# Patient Record
Sex: MALE | Race: WHITE | NOT HISPANIC OR LATINO | Employment: PART TIME | URBAN - METROPOLITAN AREA
[De-identification: names, ages, dates, MRNs, and addresses within clinical notes are randomized per-mention and may not be internally consistent; named-entity substitution may affect disease eponyms.]

---

## 2020-05-04 ENCOUNTER — TRANSFERRED RECORDS (OUTPATIENT)
Dept: HEALTH INFORMATION MANAGEMENT | Facility: CLINIC | Age: 55
End: 2020-05-04

## 2022-09-14 ENCOUNTER — TRANSFERRED RECORDS (OUTPATIENT)
Dept: HEALTH INFORMATION MANAGEMENT | Facility: CLINIC | Age: 57
End: 2022-09-14

## 2022-11-09 ENCOUNTER — MEDICAL CORRESPONDENCE (OUTPATIENT)
Dept: HEALTH INFORMATION MANAGEMENT | Facility: CLINIC | Age: 57
End: 2022-11-09

## 2022-11-16 ENCOUNTER — TELEPHONE (OUTPATIENT)
Dept: FAMILY MEDICINE | Facility: CLINIC | Age: 57
End: 2022-11-16

## 2022-11-16 NOTE — TELEPHONE ENCOUNTER
Reason for Call:  Appointment Request    Patient requesting this type of appt: Chronic Diease Management/Medication/Follow-Up    Requested provider: No primary care provider on file.    Reason patient unable to be scheduled: Not within requested timeframe    When does patient want to be seen/preferred time: 1-2 days    Comments: Need new doctor for medicine refill and establish care with. Need to be seen soon to get medicine refilled moved from out of town.    Could we send this information to you in [email or web address] or would you prefer to receive a phone call?:   Patient would prefer a phone call   Okay to leave a detailed message?: Yes at Cell number on file:    Telephone Information:   Mobile [phone]       Call taken on 11/16/2022 at 1:24 PM by Maye Kelly

## 2022-11-18 NOTE — TELEPHONE ENCOUNTER
Attempted to call patient. LVM explaining that we have very few providers taking on new patients and that they are booked out at least a few weeks and some into the new year.     Gave him our clinic number and cent sched number so he can try calling again. We do not have any way to help him within 1-2 days. My superior told me to have him reach out to his previous PCP/prescriber to see if they can help bridge his medications until he can get in. I did say this in the message.

## 2022-12-08 ENCOUNTER — OFFICE VISIT (OUTPATIENT)
Dept: FAMILY MEDICINE | Facility: CLINIC | Age: 57
End: 2022-12-08
Payer: COMMERCIAL

## 2022-12-08 VITALS
HEIGHT: 69 IN | TEMPERATURE: 98.1 F | HEART RATE: 75 BPM | OXYGEN SATURATION: 98 % | SYSTOLIC BLOOD PRESSURE: 138 MMHG | DIASTOLIC BLOOD PRESSURE: 76 MMHG | WEIGHT: 186.4 LBS | BODY MASS INDEX: 27.61 KG/M2

## 2022-12-08 DIAGNOSIS — I10 ESSENTIAL HYPERTENSION: Primary | ICD-10-CM

## 2022-12-08 DIAGNOSIS — F33.0 MILD EPISODE OF RECURRENT MAJOR DEPRESSIVE DISORDER (H): ICD-10-CM

## 2022-12-08 DIAGNOSIS — G47.00 INSOMNIA, UNSPECIFIED TYPE: ICD-10-CM

## 2022-12-08 DIAGNOSIS — L40.50 PSORIATIC ARTHRITIS (H): ICD-10-CM

## 2022-12-08 DIAGNOSIS — K21.9 GASTROESOPHAGEAL REFLUX DISEASE WITHOUT ESOPHAGITIS: ICD-10-CM

## 2022-12-08 PROBLEM — M17.0 PRIMARY OSTEOARTHRITIS OF BOTH KNEES: Status: ACTIVE | Noted: 2021-08-17

## 2022-12-08 PROBLEM — D50.9 MICROCYTIC ANEMIA: Status: ACTIVE | Noted: 2021-12-13

## 2022-12-08 PROBLEM — M25.561 CHRONIC PAIN OF BOTH KNEES: Status: ACTIVE | Noted: 2021-08-06

## 2022-12-08 PROBLEM — M25.562 CHRONIC PAIN OF BOTH KNEES: Status: ACTIVE | Noted: 2021-08-06

## 2022-12-08 PROBLEM — M17.11 PRIMARY OSTEOARTHRITIS OF RIGHT KNEE: Status: ACTIVE | Noted: 2021-09-09

## 2022-12-08 PROBLEM — G89.29 CHRONIC PAIN OF BOTH KNEES: Status: ACTIVE | Noted: 2021-08-06

## 2022-12-08 PROCEDURE — 99203 OFFICE O/P NEW LOW 30 MIN: CPT | Performed by: NURSE PRACTITIONER

## 2022-12-08 RX ORDER — DOCUSATE SODIUM 100 MG/1
100 CAPSULE, LIQUID FILLED ORAL
COMMUNITY
Start: 2021-10-21 | End: 2022-12-08

## 2022-12-08 RX ORDER — UPADACITINIB 15 MG/1
TABLET, EXTENDED RELEASE ORAL
COMMUNITY
End: 2023-02-24

## 2022-12-08 RX ORDER — MELOXICAM 15 MG/1
15 TABLET ORAL DAILY
COMMUNITY
End: 2022-12-08

## 2022-12-08 RX ORDER — AMLODIPINE BESYLATE 5 MG/1
1 TABLET ORAL DAILY
COMMUNITY
Start: 2022-08-25 | End: 2022-12-08

## 2022-12-08 RX ORDER — PIMECROLIMUS 10 MG/G
CREAM TOPICAL
COMMUNITY
Start: 2021-09-10 | End: 2022-12-08

## 2022-12-08 RX ORDER — HYDROCORTISONE 2.5 %
CREAM (GRAM) TOPICAL
COMMUNITY
Start: 2021-09-10 | End: 2022-12-08

## 2022-12-08 RX ORDER — PANTOPRAZOLE SODIUM 40 MG/1
1 TABLET, DELAYED RELEASE ORAL DAILY
COMMUNITY
Start: 2022-08-25 | End: 2022-12-08

## 2022-12-08 RX ORDER — OMEGA-3 FATTY ACIDS/FISH OIL 300-1000MG
2 CAPSULE ORAL DAILY
COMMUNITY

## 2022-12-08 RX ORDER — METHOTREXATE SODIUM 25 MG/ML
INJECTION, SOLUTION INTRA-ARTERIAL; INTRAMUSCULAR; INTRAVENOUS
COMMUNITY
Start: 2022-08-01 | End: 2023-02-24

## 2022-12-08 RX ORDER — TRAZODONE HYDROCHLORIDE 100 MG/1
100 TABLET ORAL
COMMUNITY
Start: 2022-08-25 | End: 2022-12-08

## 2022-12-08 RX ORDER — LISINOPRIL AND HYDROCHLOROTHIAZIDE 20; 25 MG/1; MG/1
1 TABLET ORAL DAILY
COMMUNITY
Start: 2022-08-25 | End: 2022-12-08

## 2022-12-08 RX ORDER — FOLIC ACID 1 MG/1
1 TABLET ORAL DAILY
COMMUNITY
Start: 2022-08-25 | End: 2022-12-08

## 2022-12-08 RX ORDER — SERTRALINE HYDROCHLORIDE 100 MG/1
1 TABLET, FILM COATED ORAL DAILY
COMMUNITY
Start: 2022-08-25 | End: 2022-12-08

## 2022-12-08 RX ORDER — MULTIPLE VITAMINS W/ MINERALS TAB 9MG-400MCG
1 TAB ORAL DAILY
COMMUNITY

## 2022-12-08 RX ORDER — CALCIPOTRIENE AND BETAMETHASONE DIPROPIONATE 50; .5 UG/G; MG/G
SUSPENSION TOPICAL
COMMUNITY
Start: 2021-11-19 | End: 2022-12-08

## 2022-12-08 ASSESSMENT — PAIN SCALES - GENERAL: PAINLEVEL: SEVERE PAIN (6)

## 2022-12-08 NOTE — PROGRESS NOTES
Assessment and Plan:     Essential hypertension  This is controlled.  He continues amlodipine and lisinopril-hydrochlorothiazide. He had normal renal function on 9/15/22.   - amLODIPine (NORVASC) 5 MG tablet  Dispense: 90 tablet; Refill: 3  - lisinopril-hydrochlorothiazide (ZESTORETIC) 20-25 MG tablet  Dispense: 90 tablet; Refill: 3    Psoriatic arthritis (H)  Will refer to rheumatology.  I encouraged him to have his prior rheumatologist refill his medications.  Otherwise, will we will consider an E-consult.   - Calcipotriene-Betameth Diprop 0.005-0.064 % SUSP  Dispense: 120 g; Refill: 3  - pimecrolimus (ELIDEL) 1 % external cream  Dispense: 100 g; Refill: 3  - hydrocortisone 2.5 % cream  Dispense: 30 g; Refill: 3  - Adult Rheumatology  Referral    Gastroesophageal reflux disease without esophagitis  This is controlled with pantoprazole.  - pantoprazole (PROTONIX) 40 MG EC tablet  Dispense: 90 tablet; Refill: 3    Mild episode of recurrent major depressive disorder (H)  This is controlled with sertraline.  - sertraline (ZOLOFT) 100 MG tablet  Dispense: 90 tablet; Refill: 3    Insomnia, unspecified type  This is controlled with trazodone.  - traZODone (DESYREL) 100 MG tablet  Dispense: 90 tablet; Refill: 3        Subjective:     Giovanni is a 57 year old male presenting to the clinic to establish care.  Patient lived in [place] have moved here to be close to his children.  Patient has hypertension which is controlled with amlodipine 5 mg daily and lisinopril-hydrochlorothiazide 20-25 mg daily.  He is consuming a healthy diet.  He has a history of rheumatoid arthritis and psoriatic arthritis.  He was prescribed methotrexate and Rinvoq.  He has been without medication for 6 weeks. He uses betamethasone cream as needed for psoriatic rash.  Patient complains of pain, primarily within his hands.  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  Patient takes pantoprazole for GERD which is well controlled.  He is taking  sertraline 100 mg daily for depression and anxiety.  He was diagnosed 7 years ago.  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  He feels as though his mood is stable.  He feels supported.  He denies thoughts of suicide.  He takes trazodone nightly to assist with falling asleep.    Answers for HPI/ROS submitted by the patient on 12/8/2022  Do you check your blood pressure regularly outside of the clinic?: No  Are your blood pressures ever more than 140 on the top number (systolic) OR more than 90 on the bottom number (diastolic)? (For example, greater than 140/90): Yes  Are you following a low salt diet?: No    Reviewof Systems: A complete 14 point review of systems was obtained and is negative or as stated in the history of present illness.    Social History     Socioeconomic History     Marital status:      Spouse name: Not on file     Number of children: Not on file     Years of education: Not on file     Highest education level: Not on file   Occupational History     Not on file   Tobacco Use     Smoking status: Never     Smokeless tobacco: Never   Substance and Sexual Activity     Alcohol use: Yes     Alcohol/week: 3.0 standard drinks     Types: 1 Glasses of wine, 1 Cans of beer, 1 Shots of liquor per week     Drug use: Never     Sexual activity: Not on file   Other Topics Concern     Not on file   Social History Narrative     Not on file     Social Determinants of Health     Financial Resource Strain: Not on file   Food Insecurity: Not on file   Transportation Needs: Not on file   Physical Activity: Not on file   Stress: Not on file   Social Connections: Not on file   Intimate Partner Violence: Not on file   Housing Stability: Not on file       Active Ambulatory Problems     Diagnosis Date Noted     Chronic pain of both knees 08/06/2021     Dyslipidemia 01/09/2019     Episodic mood disorder (H) 05/14/2015     Essential hypertension 05/14/2015     Gastroesophageal reflux disease without  "esophagitis 12/12/2016     Insomnia 05/14/2015     Microcytic anemia 12/13/2021     Primary osteoarthritis of both knees 08/17/2021     Primary osteoarthritis of right knee 09/09/2021     Psoriasis 12/08/2022     Psoriatic arthritis (H) 12/08/2022     Resolved Ambulatory Problems     Diagnosis Date Noted     No Resolved Ambulatory Problems     No Additional Past Medical History       Family History   Problem Relation Age of Onset     Coronary Artery Disease Mother      Bladder Cancer Father        Objective:     /76 (BP Location: Right arm, Patient Position: Sitting, Cuff Size: Adult Regular)   Pulse 75   Temp 98.1  F (36.7  C) (Oral)   Ht 1.746 m (5' 8.74\")   Wt 84.6 kg (186 lb 6.4 oz)   SpO2 98%   BMI 27.74 kg/m      Patient is alert, in no obvious distress.   Skin: Warm, dry.    Lungs:  Clear to auscultation. Respirations even and unlabored.  No wheezing or rales noted.   Heart:  Regular rate and rhythm.  No murmurs, S3, S4, gallops, or rubs.    Abdomen: Soft, nontender.  No organomegaly. Bowel sounds normoactive. No guarding or masses noted.   Musculoskeletal:  No edema is present in bilateral lower extremities.              "

## 2022-12-09 RX ORDER — PANTOPRAZOLE SODIUM 40 MG/1
40 TABLET, DELAYED RELEASE ORAL DAILY
Qty: 90 TABLET | Refills: 3 | Status: SHIPPED | OUTPATIENT
Start: 2022-12-09 | End: 2023-08-17

## 2022-12-09 RX ORDER — HYDROCORTISONE 2.5 %
CREAM (GRAM) TOPICAL 2 TIMES DAILY
Qty: 30 G | Refills: 3 | Status: SHIPPED | OUTPATIENT
Start: 2022-12-09

## 2022-12-09 RX ORDER — CALCIPOTRIENE AND BETAMETHASONE DIPROPIONATE 50; .5 UG/G; MG/G
SUSPENSION TOPICAL
Qty: 120 G | Refills: 3 | Status: SHIPPED | OUTPATIENT
Start: 2022-12-09

## 2022-12-09 RX ORDER — TRAZODONE HYDROCHLORIDE 100 MG/1
100 TABLET ORAL AT BEDTIME
Qty: 90 TABLET | Refills: 3 | Status: SHIPPED | OUTPATIENT
Start: 2022-12-09 | End: 2023-08-17

## 2022-12-09 RX ORDER — LISINOPRIL AND HYDROCHLOROTHIAZIDE 20; 25 MG/1; MG/1
1 TABLET ORAL DAILY
Qty: 90 TABLET | Refills: 3 | Status: SHIPPED | OUTPATIENT
Start: 2022-12-09 | End: 2023-08-17

## 2022-12-09 RX ORDER — PIMECROLIMUS 10 MG/G
CREAM TOPICAL 2 TIMES DAILY
Qty: 100 G | Refills: 3 | Status: SHIPPED | OUTPATIENT
Start: 2022-12-09

## 2022-12-09 RX ORDER — SERTRALINE HYDROCHLORIDE 100 MG/1
100 TABLET, FILM COATED ORAL DAILY
Qty: 90 TABLET | Refills: 3 | Status: SHIPPED | OUTPATIENT
Start: 2022-12-09 | End: 2023-08-17

## 2022-12-09 RX ORDER — AMLODIPINE BESYLATE 5 MG/1
5 TABLET ORAL DAILY
Qty: 90 TABLET | Refills: 3 | Status: SHIPPED | OUTPATIENT
Start: 2022-12-09 | End: 2023-08-17

## 2022-12-12 NOTE — PROGRESS NOTES
Rheumatology Clinic Visit  [facility]  Rashaun Smith M.D.     Giovanni Mcgill MRN# [id number]   YOB: 1965 Age: 57 year old   Date of Visit: 12/14/2022  Primary care provider: No primary care provider on file.          Assessment and Plan:     # Psoriatic arthritis: Patient relates accelerating pain swelling, stiffness, and decreased range of motion in multiple finger joints, right greater than left hand.  Exam shows synovial thickening at right second MCP and chronic changes consistent with prior or current inflammation in multiple finger joints.  No psoriasis notable.    Data: Outside facility laboratory data from September 2022 showed hemoglobin slightly reduced at 12.4 with an MCV of 82.6; electrolytes and creatinine were normal; albumin total protein and transaminases normal; CRP less than 3.0; sedimentation rate of 10, normal.   In 2020, hepatitis B, hepatitis C, and rheumatoid factor, cyclic citrullinated peptide antibodies were negative.    Imaging: Reports of x-rays of both hands done on May 4, 2020 revealed mild bilateral osteoarthritis of the hands and thumb bases and a well-corticated triquetrium fracture in the left wrist.    Discussion: Longstanding psoriatic arthritis, appropriately treated with a succession of immunomodulatory agents including tumor necrosis factor inhibition, methotrexate, apremilast, IL 23 inhibitors, and most recently Norman kinase inhibitor.  Symptom complex and physical exam is compatible with mild/moderate inflammatory disease activity.  Patient reports that use of Rinvoq for 4 to 6 weeks in early fall 2022 was associated with improvement in pain and swelling.  I think that both for reduction of symptoms, and for protection of joints against long-term damage, immunomodulatory therapy is warranted.  I agree with prior rheumatology recommendations to make a trial of upadacitinib.  I do not think that patient has used the agent to fully assess efficacy.  I  "recommend resuming upadacitinib after establishing baseline LFT, kidney function, CBC, and lipid panel lab measurements.  I expect maximum benefit from upadacitinib in 6 to 8 weeks.  Alternative treatments to consider would include addition of methotrexate to upadacitinib, or to consider abatacept for alternative IL-17 antagonist (Taltz, patient has already tried and discarded secukinumab).    Plan:  1.  Check kidney function, liver function, blood counts, and lipid panel.  2.  Resume upadacitinib 15 mg once daily for psoriatic arthritis.  Expect maximum benefit in 4 to 6 weeks.  3.  Report to rheumatology on tolerability and benefit at 6-8 weeks.  4.  For residual occasional joint pain, use ibuprofen 600 to 800 mg up to twice daily as needed.    Rashaun Smith MD  Staff Rheumatologist, [facility]    On the day of the encounter, a total of 65 minutes was spent in chart review, and in counseling and coordination of care, regarding the patient's complex medical problem of chronic psoriatic arthritis.             History of Present Illness:   Giovanni Mcgill presents for evaluation of psoriatic arthritis.    Patient was last seen by Rheumatologist Dr. Cardoso in [place] in 8-2022. Except from Dr. Cardoso's note containing timeline of anti-rheumatic medication use for psoriatic arthritis:    \"...Due to persistent joint swelling and pain over his hands despite chronic Humira use, treatment was switched to Otezla in Feb 2020, after discussion with Dermatology, given prior good response (was discontinued in the past due to change in insurance coverage). However, patient did not have as good of a response to Otezla this time around, and it was switched to Cosentyx (May 2020). Due to persistent MSK symptoms, MTX was added in June 2020. However, due to persistent disease, Cosentyx was switched to Enbrel (Sept 2020). Patient did better on combination of subQ MTX + Enbrel. SSZ was added in Aug 2021 for residual MSK symptoms, but " "was discontinued soon after due to significant GI side effects. Due to residual MSK symptoms on Enbrel previously, discussed switching therapy to either Stelara, Tremfya, or Taltz -- patient opted for Stelara (Nov 2021). However, patient had persistent MSK symptoms on chronic MTX + Stelara. Stelara was thus switched to Tremfya (Apr 2022)...\"      In 8-12 Dr. Cardoso noted persistent swelling over right wrist and right 2nd MCP on exam. Patient has had 3 doses of Tremfya so far (including loading dose), and he mentions having persistent polyarthritis on this and full dose subQ MTX. Denies any missed doses of DMARD therapy. Impression was of active psoriatic arthritis, recently started on Tremfya.  Plan was:    \"- Will give Tremfya a bit more time to adequately assess efficacy, or lack thereof -- continue 100mg subQ every 8 weeks as maintenance dose  - Offered prednisone vs IM corticosteroid for persistent MSK symptoms, but patient opted to hold off for now  - Continue subQ MTX 25mg weekly  - Continue folic acid 1mg daily  - Check repeat CBC/D, CMP, ESR, and CRP in 8 weeks for MTX toxicity monitoring\"    Patient saw rheumatology 1 further time in September 2022.  At that visit, lack of response to Tremfya was noted.  Recommendation was to discontinue Tremfya and to start Rinvoq daily.    Initial history December 14, 2022    Patient reports onset of swelling, redness, and pain in multiple finger and knuckle joints at around age 31.  At approximately the same time, he experienced onset of a scaly \"patch\" on the left knee, and occasional scaly patches over the belly, gluteal cleft, scalp, and chest.  He did not have psoriasis as a teenager or child.  The scaly patches were manageable with use of as needed Elidel cream.  Joint pain progressed and interfered with activities of daily living.  He reports receiving significant relief with use of tapering prednisone, but when prednisone was removed, he was started on " methotrexate.  Methotrexate gave inadequate relief after 3 to 4 months.  He was then treated with etanercept with good response.  He believes he started etanercept shortly after it became FDA approved in the early 2000's, and used it continuously until approximately 2012.  At that time, etanercept was discontinued and Humira was substituted due to a change of insurance.  He had continued relief of inflammatory joint symptoms as well as reduced psoriasis with use of Humira.  In the range of 9751-1989, he substituted Otezla for Humira; again good relief of both joint and skin symptoms were noted, but insurance dictated that Humira be restarted as of approximately 2015.  He took Humira through February 2020, when Otezla was again tried.  At that point, patient endorses the antirheumatic medication history that Dr. Taylor listed in his notes of September and July 2022.    In the last 5 months, he reports that Tremfya was discontinued after approximately 3 to 4 months trial.  He noted no change in daily joint pain.  As of early September, Tremfya was discontinued, and Rinvoq was substituted.  He reports that within 3 to 4 weeks, he noticed swelling and pain in his right second knuckle decreasing.  He was only able to continue Rinvoq for approximately 1 month before he changed residence and moved from [place] to [place].  He ran out of Rinvoq (he reports that he was given samples by previous treating rheumatologist) in mid October.  He has not used Rinvoq or methotrexate since mid October 2022.    Today he has pain in base of both thumbs, notes shape change in fingers of both hands.  Pain is gradually worsening, and difficulty with finger motion as increased in the last 6 weeks.  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  Holding and strumming the guitar has become more painful and difficult.  Lifting, gripping, opening jars are difficult because of decreased and painful painful pinch, especially right hand. He is R  handed, symptoms worse consistently in that hand.  Morning stiffness in the hands is approximately 30 to 60 minutes.  He denies eye pain, GI/ symptoms, low back pain, fevers chills or sweats, or worsening of psoriasis in the last several months.  He has not used oral prednisone for over a year.  He occasionally uses ibuprofen Tylenol or Aleve once or twice monthly.         Review of Systems:   Except for HPI, negative  Constitutional: negative  Skin: negative  Eyes: negative  Ears/Nose/Throat: negative  Respiratory: No shortness of breath, dyspnea on exertion, cough, or hemoptysis  Cardiovascular: negative  Gastrointestinal: negative  Genitourinary: negative  Musculoskeletal: negative  Neurologic: negative  Psychiatric: negative  Hematologic/Lymphatic/Immunologic: negative  Endocrine: negative         Active Problem List:     Patient Active Problem List    Diagnosis Date Noted     Psoriasis 12/08/2022     Priority: Medium     Formatting of this note might be different from the original.  On Stelara sees dermatology    Last Assessment & Plan:   Formatting of this note might be different from the original.  Continue the same       Psoriatic arthritis (H) 12/08/2022     Priority: Medium     Formatting of this note might be different from the original.  Sees rheumatology and derm  Recently started [facility].      Last Assessment & Plan:   Formatting of this note might be different from the original.  Continue the same       Microcytic anemia 12/13/2021     Priority: Medium     Formatting of this note might be different from the original.  Iron values are better and WNL.  Iron sulfate caused GI issues. Suspect has a component of AOCD. Will monitor.   He will start a multivitamin with iron.       Primary osteoarthritis of right knee 09/09/2021     Priority: Medium     Primary osteoarthritis of both knees 08/17/2021     Priority: Medium     Formatting of this note might be different from the original.  Per rheumatology.        Chronic pain of both knees 08/06/2021     Priority: Medium     Formatting of this note might be different from the original.  Per rheumatology.       Dyslipidemia 01/09/2019     Priority: Medium     Formatting of this note might be different from the original.  Not on meds       Gastroesophageal reflux disease without esophagitis 12/12/2016     Priority: Medium     Formatting of this note might be different from the original.  Well controled no dysphagia. Denies dark stools, BPR, and hemopytsis.  On protonix    Last Assessment & Plan:   Formatting of this note might be different from the original.  Stable continue the same.       Episodic mood disorder (H) 05/14/2015     Priority: Medium     Formatting of this note might be different from the original.  stable on zoloft.  Denies HSI, AVH, anhedonia, despair    Last Assessment & Plan:   Formatting of this note might be different from the original.  Continue the same       Essential hypertension 05/14/2015     Priority: Medium     Formatting of this note is different from the original.  BP Readings from Last 3 Encounters:   12/13/21 (!) 180/110   11/09/21 (!) 181/105   08/03/21 (!) 164/92     Taking meds.  Not well controlled. Denies chest pain and SOB, swelling, dizziness or orthostatics.  Increase HCTZ and add amlodipine.         Last Assessment & Plan:   Formatting of this note might be different from the original.  Will increase lisinopril to 20 mg. Leave HCTZ at 12.5 mg       Insomnia 05/14/2015     Priority: Medium     Formatting of this note might be different from the original.  Stable on trazodone  Continue same.       Last Assessment & Plan:   Formatting of this note might be different from the original.  Stable continue the same.              Past Medical History:   No past medical history on file.  Past Surgical History:   Procedure Laterality Date     REPLACEMENT TOTAL KNEE Bilateral    # Scoliosis since adolescence:  # Hiatal hernia: on protonix  "continuously for 20 year  #S/p TKR in 9 and 10 of 2021: reports being bow-legged. Much improved now.    # Psoriatic arthritis: Summary from [facility] 8-22\"...Due to persistent joint swelling and pain over his hands despite chronic Humira use, treatment was switched to Otezla in Feb 2020, after discussion with Dermatology, given prior good response (was discontinued in the past due to change in insurance coverage). However, patient did not have as good of a response to Otezla this time around, and it was switched to Cosentyx (May 2020). Due to persistent MSK symptoms, MTX was added in June 2020. However, due to persistent disease, Cosentyx was switched to Enbrel (Sept 2020). Patient did better on combination of subQ MTX + Enbrel. SSZ was added in Aug 2021 for residual MSK symptoms, but was discontinued soon after due to significant GI side effects. Due to residual MSK symptoms on Enbrel previously, discussed switching therapy to either Stelara, Tremfya, or Taltz -- patient opted for Stelara (Nov 2021). However, patient had persistent MSK symptoms on chronic MTX + Stelara. Stelara was thus switched to Tremfya (Apr 2022)...\"         Social History:     Social History     Socioeconomic History     Marital status:      Spouse name: Not on file     Number of children: Not on file     Years of education: Not on file     Highest education level: Not on file   Occupational History     Not on file   Tobacco Use     Smoking status: Never     Smokeless tobacco: Never   Substance and Sexual Activity     Alcohol use: Yes     Alcohol/week: 3.0 standard drinks     Types: 1 Glasses of wine, 1 Cans of beer, 1 Shots of liquor per week     Drug use: Never     Sexual activity: Not on file   Other Topics Concern     Not on file   Social History Narrative     Not on file     Social Determinants of Health     Financial Resource Strain: Not on file   Food Insecurity: Not on file   Transportation Needs: Not on file " "  Physical Activity: Not on file   Stress: Not on file   Social Connections: Not on file   Intimate Partner Violence: Not on file   Housing Stability: Not on file     Has worked as a //. 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  Has 2 biological children, healthy  Never smoker  Drinks 1 EtOH once weekly.       Family History:     Family History   Problem Relation Age of Onset     Coronary Artery Disease Mother      Bladder Cancer Father      MGF had \"skin disease\"  Mother has OA with THR an TKR         Allergies:   No Known Allergies         Medications:     Current Outpatient Medications   Medication Sig Dispense Refill     amLODIPine (NORVASC) 5 MG tablet Take 1 tablet (5 mg) by mouth daily 90 tablet 3     aspirin (ASA) 81 MG EC tablet        Calcipotriene-Betameth Diprop 0.005-0.064 % SUSP Apply twice daily to your affected skin for up to 2 weeks. Strength: 0.005-0.064 % 120 g 3     hydrocortisone 2.5 % cream Apply topically 2 times daily 30 g 3     lisinopril-hydrochlorothiazide (ZESTORETIC) 20-25 MG tablet Take 1 tablet by mouth daily 90 tablet 3     multivitamin w/minerals (THERA-VIT-M) tablet Take 1 tablet by mouth daily       omega 3 1000 MG CAPS Take 2 g by mouth daily       pantoprazole (PROTONIX) 40 MG EC tablet Take 1 tablet (40 mg) by mouth daily 90 tablet 3     pimecrolimus (ELIDEL) 1 % external cream Apply topically 2 times daily 100 g 3     sertraline (ZOLOFT) 100 MG tablet Take 1 tablet (100 mg) by mouth daily 90 tablet 3     traZODone (DESYREL) 100 MG tablet Take 1 tablet (100 mg) by mouth At Bedtime 90 tablet 3     Methotrexate 25 MG/ML SOSY Inject 25 mg Subcutaneous (Patient not taking: Reported on 12/8/2022)       methotrexate 250 MG/10ML SOLN injection INJECT 1ML SUBCUTANEOUSLY (UNDER THE SKIN) ONCE A WEEK (MULTI-DOSE VIAL) (Patient not taking: Reported on 12/8/2022)       Upadacitinib ER (RINVOQ) 15 MG TB24  (Patient not taking: Reported on 12/14/2022)              " "Physical Exam:   Blood pressure (!) 163/75, pulse 73, height 1.727 m (5' 8\"), weight 84.6 kg (186 lb 6.4 oz).  Wt Readings from Last 6 Encounters:   12/14/22 84.6 kg (186 lb 6.4 oz)   12/08/22 84.6 kg (186 lb 6.4 oz)     Constitutional: well-developed, appearing stated age; cooperative  Eyes: nl EOM, PERRLA, vision, conjunctiva, sclera  ENT: nl external ears, nose, hearing, lips, teeth, gums, throat  No mucous membrane lesions, normal saliva pool  Neck: no mass or thyroid enlargement  Resp: breathing unlabored  Lymph: no cervical, supraclavicular, inguinal or epitrochlear nodes  MS: There is enlargement and synovial thickening at the right second MCP; angulation and bony enlargement notable at multiple DIPs in both hands, prominently right second DIP with early \"swan-neck\" change.   strength is slightly reduced, right greater than left.  There is incomplete flexion of second fourth and fifth DIPs with fist formation in the right greater than left hands.  Cool swelling without tenderness noted at left wrist.  Wrist range of motion intact, as is elbow shoulder hip ankle and midfoot.  No tenderness at MTPs and no visible dactylitis or enthesopathy on lower extremity exam.  Low back shows no tenderness at SI joint; lumbar spine range of motion is intact.  There is rightward convex scoliosis in the thoracic spine.  Skin: no psoriasis visilbe at knees, low back  Neuro: nl cranial nerves, strength  Psych: nl judgement, orientation, memory, affect.         Data:     @No flowsheet data found.     ,  ,  ,  ,  ,  ,  ,  ,  ,  ,  ,  ,  ,  ,  ,  ,  ,  ,  ,  ,  ,  ,  ,  ,  ,  ,  ,  ,  ,  ,  ,  ,  ,  ,  ,  ,  ,  ,  ,  ,  ,  ,  ,  ,       "

## 2022-12-13 NOTE — TELEPHONE ENCOUNTER
NOTES Status Details   OFFICE NOTE from referring provider Internal 12.08.2022 Breanna Odell APRN CNP   OFFICE NOTE from other specialist Care Everywhere 09.14.2022 Yrn Cardoso MD     DISCHARGE SUMMARY from hospital     DISCHARGE REPORT from the ER     MEDICATION LIST Care Everywhere  /Internal    LABS (Any and all labs)      Care Everywhere    Biopsy/pathology (Anything related to diagnoses I.e. fluid aspirations, lip biopsy, muscle biopsy)               Imaging (All imaging related to diagnoses)     Echo     HRCT     CXR     EMG                    Scleroderma/Dermatomyositis diagnoses     Previous Cardiology notes      Previous Pulmonary notes     Previous Dermatology notes     Previous GI notes     Lupus diagnoses     Previous Nephrology notes     Previous Dermatology notes     Previous Cardiology notes

## 2022-12-14 ENCOUNTER — LAB (OUTPATIENT)
Dept: LAB | Facility: CLINIC | Age: 57
End: 2022-12-14
Payer: COMMERCIAL

## 2022-12-14 ENCOUNTER — TELEPHONE (OUTPATIENT)
Dept: RHEUMATOLOGY | Facility: CLINIC | Age: 57
End: 2022-12-14

## 2022-12-14 ENCOUNTER — OFFICE VISIT (OUTPATIENT)
Dept: RHEUMATOLOGY | Facility: CLINIC | Age: 57
End: 2022-12-14
Attending: INTERNAL MEDICINE
Payer: COMMERCIAL

## 2022-12-14 ENCOUNTER — PRE VISIT (OUTPATIENT)
Dept: RHEUMATOLOGY | Facility: CLINIC | Age: 57
End: 2022-12-14

## 2022-12-14 VITALS
HEART RATE: 73 BPM | DIASTOLIC BLOOD PRESSURE: 75 MMHG | HEIGHT: 68 IN | BODY MASS INDEX: 28.25 KG/M2 | SYSTOLIC BLOOD PRESSURE: 163 MMHG | WEIGHT: 186.4 LBS

## 2022-12-14 DIAGNOSIS — M19.90 INFLAMMATORY ARTHRITIS: Primary | ICD-10-CM

## 2022-12-14 DIAGNOSIS — L40.50 PSORIATIC ARTHRITIS (H): ICD-10-CM

## 2022-12-14 DIAGNOSIS — M19.90 INFLAMMATORY ARTHRITIS: ICD-10-CM

## 2022-12-14 LAB
ALBUMIN SERPL BCG-MCNC: 4.5 G/DL (ref 3.5–5.2)
ALT SERPL W P-5'-P-CCNC: 24 U/L (ref 10–50)
AST SERPL W P-5'-P-CCNC: 21 U/L (ref 10–50)
CHOLEST SERPL-MCNC: 196 MG/DL
CREAT SERPL-MCNC: 0.99 MG/DL (ref 0.67–1.17)
ERYTHROCYTE [DISTWIDTH] IN BLOOD BY AUTOMATED COUNT: 15.2 % (ref 10–15)
GFR SERPL CREATININE-BSD FRML MDRD: 89 ML/MIN/1.73M2
HCT VFR BLD AUTO: 39.7 % (ref 40–53)
HDLC SERPL-MCNC: 40 MG/DL
HGB BLD-MCNC: 12.9 G/DL (ref 13.3–17.7)
LDLC SERPL CALC-MCNC: 133 MG/DL
MCH RBC QN AUTO: 26.6 PG (ref 26.5–33)
MCHC RBC AUTO-ENTMCNC: 32.5 G/DL (ref 31.5–36.5)
MCV RBC AUTO: 82 FL (ref 78–100)
NONHDLC SERPL-MCNC: 156 MG/DL
PLATELET # BLD AUTO: 230 10E3/UL (ref 150–450)
RBC # BLD AUTO: 4.85 10E6/UL (ref 4.4–5.9)
TRIGL SERPL-MCNC: 116 MG/DL
WBC # BLD AUTO: 5.6 10E3/UL (ref 4–11)

## 2022-12-14 PROCEDURE — 85027 COMPLETE CBC AUTOMATED: CPT | Performed by: PATHOLOGY

## 2022-12-14 PROCEDURE — 84460 ALANINE AMINO (ALT) (SGPT): CPT | Performed by: PATHOLOGY

## 2022-12-14 PROCEDURE — 36415 COLL VENOUS BLD VENIPUNCTURE: CPT | Performed by: PATHOLOGY

## 2022-12-14 PROCEDURE — 82565 ASSAY OF CREATININE: CPT | Performed by: PATHOLOGY

## 2022-12-14 PROCEDURE — G0463 HOSPITAL OUTPT CLINIC VISIT: HCPCS | Performed by: INTERNAL MEDICINE

## 2022-12-14 PROCEDURE — 84450 TRANSFERASE (AST) (SGOT): CPT | Performed by: PATHOLOGY

## 2022-12-14 PROCEDURE — 99205 OFFICE O/P NEW HI 60 MIN: CPT | Performed by: INTERNAL MEDICINE

## 2022-12-14 PROCEDURE — 80061 LIPID PANEL: CPT | Performed by: PATHOLOGY

## 2022-12-14 PROCEDURE — 82040 ASSAY OF SERUM ALBUMIN: CPT | Performed by: PATHOLOGY

## 2022-12-14 PROCEDURE — G0463 HOSPITAL OUTPT CLINIC VISIT: HCPCS

## 2022-12-14 ASSESSMENT — PAIN SCALES - GENERAL: PAINLEVEL: SEVERE PAIN (7)

## 2022-12-14 NOTE — TELEPHONE ENCOUNTER
PA Initiation    Medication: Rinvoq PA  Insurance Company: [employer] - Phone [phone] Fax [phone]  Pharmacy Filling the Rx:    Filling Pharmacy Phone:    Filling Pharmacy Fax:    Start Date: 12/14/2022    Key: [id number]       no known allergies

## 2022-12-14 NOTE — PATIENT INSTRUCTIONS
Diagnosis:  1.  Psoriatic arthritis: Inflammation at several finger joints and knuckles is present. I recommend resuming immunomodulatory medication.    Plan:  1.  Check kidney function, liver function, blood counts, and lipid panel.  2.  Resume upadacitinib 15 mg once daily for psoriatic arthritis.  Expect maximum benefit in 4 to 6 weeks.  3.  Report to rheumatology on tolerability and benefit at 6-8 weeks.  4.  For residual occasional joint pain, use ibuprofen 600 to 800 mg up to twice daily as needed.

## 2022-12-14 NOTE — LETTER
Date:December 20, 2022      Patient was self referred, no letter generated. Do not send.        [facility] Health Information

## 2022-12-14 NOTE — NURSING NOTE
"Chief Complaint   Patient presents with     Consult     Establish care with RA     BP (!) 163/75   Pulse 73   Ht 1.727 m (5' 8\")   Wt 84.6 kg (186 lb 6.4 oz)   BMI 28.34 kg/m    Sheri Beach CMA on 12/14/2022 at 8:07 AM    "

## 2022-12-14 NOTE — LETTER
12/14/2022       RE: Giovanni Mcgill  [address]  [place] [address]     Dear Colleague,    Thank you for referring your patient, Giovanni Mcgill, to the [facility] [place] at [facility]. Please see a copy of my visit note below.    Rheumatology Clinic Visit  [facility]  Rashaun Smith M.D.     Giovanni Mcgill MRN# [id number]   YOB: 1965 Age: 57 year old   Date of Visit: 12/14/2022  Primary care provider: No primary care provider on file.          Assessment and Plan:     # Psoriatic arthritis: Patient relates accelerating pain swelling, stiffness, and decreased range of motion in multiple finger joints, right greater than left hand.  Exam shows synovial thickening at right second MCP and chronic changes consistent with prior or current inflammation in multiple finger joints.  No psoriasis notable.    Data: Outside facility laboratory data from September 2022 showed hemoglobin slightly reduced at 12.4 with an MCV of 82.6; electrolytes and creatinine were normal; albumin total protein and transaminases normal; CRP less than 3.0; sedimentation rate of 10, normal.   In 2020, hepatitis B, hepatitis C, and rheumatoid factor, cyclic citrullinated peptide antibodies were negative.    Imaging: Reports of x-rays of both hands done on May 4, 2020 revealed mild bilateral osteoarthritis of the hands and thumb bases and a well-corticated triquetrium fracture in the left wrist.    Discussion: Longstanding psoriatic arthritis, appropriately treated with a succession of immunomodulatory agents including tumor necrosis factor inhibition, methotrexate, apremilast, IL 23 inhibitors, and most recently Norman kinase inhibitor.  Symptom complex and physical exam is compatible with mild/moderate inflammatory disease activity.  Patient reports that use of Rinvoq for 4 to 6 weeks in early fall 2022 was associated with improvement in  "pain and swelling.  I think that both for reduction of symptoms, and for protection of joints against long-term damage, immunomodulatory therapy is warranted.  I agree with prior rheumatology recommendations to make a trial of upadacitinib.  I do not think that patient has used the agent to fully assess efficacy.  I recommend resuming upadacitinib after establishing baseline LFT, kidney function, CBC, and lipid panel lab measurements.  I expect maximum benefit from upadacitinib in 6 to 8 weeks.  Alternative treatments to consider would include addition of methotrexate to upadacitinib, or to consider abatacept for alternative IL-17 antagonist (Taltz, patient has already tried and discarded secukinumab).    Plan:  1.  Check kidney function, liver function, blood counts, and lipid panel.  2.  Resume upadacitinib 15 mg once daily for psoriatic arthritis.  Expect maximum benefit in 4 to 6 weeks.  3.  Report to rheumatology on tolerability and benefit at 6-8 weeks.  4.  For residual occasional joint pain, use ibuprofen 600 to 800 mg up to twice daily as needed.    Rashaun Smith MD  Staff Rheumatologist, [facility]    On the day of the encounter, a total of 65 minutes was spent in chart review, and in counseling and coordination of care, regarding the patient's complex medical problem of chronic psoriatic arthritis.             History of Present Illness:   Giovanni Mcgill presents for evaluation of psoriatic arthritis.    Patient was last seen by Rheumatologist Dr. Cardoso in [place] in 8-2022. Except from Dr. Cardoso's note containing timeline of anti-rheumatic medication use for psoriatic arthritis:    \"...Due to persistent joint swelling and pain over his hands despite chronic Humira use, treatment was switched to Otezla in Feb 2020, after discussion with Dermatology, given prior good response (was discontinued in the past due to change in insurance coverage). However, patient did not have as good of a response to Otezla " "this time around, and it was switched to Cosentyx (May 2020). Due to persistent MSK symptoms, MTX was added in June 2020. However, due to persistent disease, Cosentyx was switched to Enbrel (Sept 2020). Patient did better on combination of subQ MTX + Enbrel. SSZ was added in Aug 2021 for residual MSK symptoms, but was discontinued soon after due to significant GI side effects. Due to residual MSK symptoms on Enbrel previously, discussed switching therapy to either Stelara, Tremfya, or Taltz -- patient opted for Stelara (Nov 2021). However, patient had persistent MSK symptoms on chronic MTX + Stelara. Stelara was thus switched to Tremfya (Apr 2022)...\"      In 8-12 Dr. Cardoso noted persistent swelling over right wrist and right 2nd MCP on exam. Patient has had 3 doses of Tremfya so far (including loading dose), and he mentions having persistent polyarthritis on this and full dose subQ MTX. Denies any missed doses of DMARD therapy. Impression was of active psoriatic arthritis, recently started on Tremfya.  Plan was:    \"- Will give Tremfya a bit more time to adequately assess efficacy, or lack thereof -- continue 100mg subQ every 8 weeks as maintenance dose  - Offered prednisone vs IM corticosteroid for persistent MSK symptoms, but patient opted to hold off for now  - Continue subQ MTX 25mg weekly  - Continue folic acid 1mg daily  - Check repeat CBC/D, CMP, ESR, and CRP in 8 weeks for MTX toxicity monitoring\"    Patient saw rheumatology 1 further time in September 2022.  At that visit, lack of response to Tremfya was noted.  Recommendation was to discontinue Tremfya and to start Rinvoq daily.    Initial history December 14, 2022    Patient reports onset of swelling, redness, and pain in multiple finger and knuckle joints at around age 31.  At approximately the same time, he experienced onset of a scaly \"patch\" on the left knee, and occasional scaly patches over the belly, gluteal cleft, scalp, and chest.  He did not " have psoriasis as a teenager or child.  The scaly patches were manageable with use of as needed Elidel cream.  Joint pain progressed and interfered with activities of daily living.  He reports receiving significant relief with use of tapering prednisone, but when prednisone was removed, he was started on methotrexate.  Methotrexate gave inadequate relief after 3 to 4 months.  He was then treated with etanercept with good response.  He believes he started etanercept shortly after it became FDA approved in the early 2000's, and used it continuously until approximately 2012.  At that time, etanercept was discontinued and Humira was substituted due to a change of insurance.  He had continued relief of inflammatory joint symptoms as well as reduced psoriasis with use of Humira.  In the range of 9145-5754, he substituted Otezla for Humira; again good relief of both joint and skin symptoms were noted, but insurance dictated that Humira be restarted as of approximately 2015.  He took Humira through February 2020, when Otezla was again tried.  At that point, patient endorses the antirheumatic medication history that Dr. Taylor listed in his notes of September and July 2022.    In the last 5 months, he reports that Tremfya was discontinued after approximately 3 to 4 months trial.  He noted no change in daily joint pain.  As of early September, Tremfya was discontinued, and Rinvoq was substituted.  He reports that within 3 to 4 weeks, he noticed swelling and pain in his right second knuckle decreasing.  He was only able to continue Rinvoq for approximately 1 month before he changed residence and moved from [place] to [place].  He ran out of Rinvoq (he reports that he was given samples by previous treating rheumatologist) in mid October.  He has not used Rinvoq or methotrexate since mid October 2022.    Today he has pain in base of both thumbs, notes shape change in fingers of both hands.  Pain is gradually worsening, and  difficulty with finger motion as increased in the last 6 weeks.  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  Holding and strumming the guitar has become more painful and difficult.  Lifting, gripping, opening jars are difficult because of decreased and painful painful pinch, especially right hand. He is R handed, symptoms worse consistently in that hand.  Morning stiffness in the hands is approximately 30 to 60 minutes.  He denies eye pain, GI/ symptoms, low back pain, fevers chills or sweats, or worsening of psoriasis in the last several months.  He has not used oral prednisone for over a year.  He occasionally uses ibuprofen Tylenol or Aleve once or twice monthly.         Review of Systems:   Except for HPI, negative  Constitutional: negative  Skin: negative  Eyes: negative  Ears/Nose/Throat: negative  Respiratory: No shortness of breath, dyspnea on exertion, cough, or hemoptysis  Cardiovascular: negative  Gastrointestinal: negative  Genitourinary: negative  Musculoskeletal: negative  Neurologic: negative  Psychiatric: negative  Hematologic/Lymphatic/Immunologic: negative  Endocrine: negative         Active Problem List:     Patient Active Problem List    Diagnosis Date Noted     Psoriasis 12/08/2022     Priority: Medium     Formatting of this note might be different from the original.  On Stelara sees dermatology    Last Assessment & Plan:   Formatting of this note might be different from the original.  Continue the same       Psoriatic arthritis (H) 12/08/2022     Priority: Medium     Formatting of this note might be different from the original.  Sees rheumatology and derm  Recently started [facility].      Last Assessment & Plan:   Formatting of this note might be different from the original.  Continue the same       Microcytic anemia 12/13/2021     Priority: Medium     Formatting of this note might be different from the original.  Iron values are better and WNL.  Iron sulfate caused GI issues. Suspect has a  component of AOCD. Will monitor.   He will start a multivitamin with iron.       Primary osteoarthritis of right knee 09/09/2021     Priority: Medium     Primary osteoarthritis of both knees 08/17/2021     Priority: Medium     Formatting of this note might be different from the original.  Per rheumatology.       Chronic pain of both knees 08/06/2021     Priority: Medium     Formatting of this note might be different from the original.  Per rheumatology.       Dyslipidemia 01/09/2019     Priority: Medium     Formatting of this note might be different from the original.  Not on meds       Gastroesophageal reflux disease without esophagitis 12/12/2016     Priority: Medium     Formatting of this note might be different from the original.  Well controled no dysphagia. Denies dark stools, BPR, and hemopytsis.  On protonix    Last Assessment & Plan:   Formatting of this note might be different from the original.  Stable continue the same.       Episodic mood disorder (H) 05/14/2015     Priority: Medium     Formatting of this note might be different from the original.  stable on zoloft.  Denies HSI, AVH, anhedonia, despair    Last Assessment & Plan:   Formatting of this note might be different from the original.  Continue the same       Essential hypertension 05/14/2015     Priority: Medium     Formatting of this note is different from the original.  BP Readings from Last 3 Encounters:   12/13/21 (!) 180/110   11/09/21 (!) 181/105   08/03/21 (!) 164/92     Taking meds.  Not well controlled. Denies chest pain and SOB, swelling, dizziness or orthostatics.  Increase HCTZ and add amlodipine.         Last Assessment & Plan:   Formatting of this note might be different from the original.  Will increase lisinopril to 20 mg. Leave HCTZ at 12.5 mg       Insomnia 05/14/2015     Priority: Medium     Formatting of this note might be different from the original.  Stable on trazodone  Continue same.       Last Assessment & Plan:  "  Formatting of this note might be different from the original.  Stable continue the same.              Past Medical History:   No past medical history on file.  Past Surgical History:   Procedure Laterality Date     REPLACEMENT TOTAL KNEE Bilateral    # Scoliosis since adolescence:  # Hiatal hernia: on protonix continuously for 20 year  #S/p TKR in 9 and 10 of 2021: reports being bow-legged. Much improved now.    # Psoriatic arthritis: Summary from [facility] 8-22\"...Due to persistent joint swelling and pain over his hands despite chronic Humira use, treatment was switched to Otezla in Feb 2020, after discussion with Dermatology, given prior good response (was discontinued in the past due to change in insurance coverage). However, patient did not have as good of a response to Otezla this time around, and it was switched to Cosentyx (May 2020). Due to persistent MSK symptoms, MTX was added in June 2020. However, due to persistent disease, Cosentyx was switched to Enbrel (Sept 2020). Patient did better on combination of subQ MTX + Enbrel. SSZ was added in Aug 2021 for residual MSK symptoms, but was discontinued soon after due to significant GI side effects. Due to residual MSK symptoms on Enbrel previously, discussed switching therapy to either Stelara, Tremfya, or Taltz -- patient opted for Stelara (Nov 2021). However, patient had persistent MSK symptoms on chronic MTX + Stelara. Stelara was thus switched to Tremfya (Apr 2022)...\"         Social History:     Social History     Socioeconomic History     Marital status:      Spouse name: Not on file     Number of children: Not on file     Years of education: Not on file     Highest education level: Not on file   Occupational History     Not on file   Tobacco Use     Smoking status: Never     Smokeless tobacco: Never   Substance and Sexual Activity     Alcohol use: Yes     Alcohol/week: 3.0 standard drinks     Types: 1 Glasses of wine, 1 Cans of beer, " "1 Shots of liquor per week     Drug use: Never     Sexual activity: Not on file   Other Topics Concern     Not on file   Social History Narrative     Not on file     Social Determinants of Health     Financial Resource Strain: Not on file   Food Insecurity: Not on file   Transportation Needs: Not on file   Physical Activity: Not on file   Stress: Not on file   Social Connections: Not on file   Intimate Partner Violence: Not on file   Housing Stability: Not on file     Has worked as a //. 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  Has 2 biological children, healthy  Never smoker  Drinks 1 EtOH once weekly.       Family History:     Family History   Problem Relation Age of Onset     Coronary Artery Disease Mother      Bladder Cancer Father      MGF had \"skin disease\"  Mother has OA with THR an TKR         Allergies:   No Known Allergies         Medications:     Current Outpatient Medications   Medication Sig Dispense Refill     amLODIPine (NORVASC) 5 MG tablet Take 1 tablet (5 mg) by mouth daily 90 tablet 3     aspirin (ASA) 81 MG EC tablet        Calcipotriene-Betameth Diprop 0.005-0.064 % SUSP Apply twice daily to your affected skin for up to 2 weeks. Strength: 0.005-0.064 % 120 g 3     hydrocortisone 2.5 % cream Apply topically 2 times daily 30 g 3     lisinopril-hydrochlorothiazide (ZESTORETIC) 20-25 MG tablet Take 1 tablet by mouth daily 90 tablet 3     multivitamin w/minerals (THERA-VIT-M) tablet Take 1 tablet by mouth daily       omega 3 1000 MG CAPS Take 2 g by mouth daily       pantoprazole (PROTONIX) 40 MG EC tablet Take 1 tablet (40 mg) by mouth daily 90 tablet 3     pimecrolimus (ELIDEL) 1 % external cream Apply topically 2 times daily 100 g 3     sertraline (ZOLOFT) 100 MG tablet Take 1 tablet (100 mg) by mouth daily 90 tablet 3     traZODone (DESYREL) 100 MG tablet Take 1 tablet (100 mg) by mouth At Bedtime 90 tablet 3     Methotrexate 25 MG/ML SOSY Inject 25 mg Subcutaneous " "(Patient not taking: Reported on 12/8/2022)       methotrexate 250 MG/10ML SOLN injection INJECT 1ML SUBCUTANEOUSLY (UNDER THE SKIN) ONCE A WEEK (MULTI-DOSE VIAL) (Patient not taking: Reported on 12/8/2022)       Upadacitinib ER (RINVOQ) 15 MG TB24  (Patient not taking: Reported on 12/14/2022)              Physical Exam:   Blood pressure (!) 163/75, pulse 73, height 1.727 m (5' 8\"), weight 84.6 kg (186 lb 6.4 oz).  Wt Readings from Last 6 Encounters:   12/14/22 84.6 kg (186 lb 6.4 oz)   12/08/22 84.6 kg (186 lb 6.4 oz)     Constitutional: well-developed, appearing stated age; cooperative  Eyes: nl EOM, PERRLA, vision, conjunctiva, sclera  ENT: nl external ears, nose, hearing, lips, teeth, gums, throat  No mucous membrane lesions, normal saliva pool  Neck: no mass or thyroid enlargement  Resp: breathing unlabored  Lymph: no cervical, supraclavicular, inguinal or epitrochlear nodes  MS: There is enlargement and synovial thickening at the right second MCP; angulation and bony enlargement notable at multiple DIPs in both hands, prominently right second DIP with early \"swan-neck\" change.   strength is slightly reduced, right greater than left.  There is incomplete flexion of second fourth and fifth DIPs with fist formation in the right greater than left hands.  Cool swelling without tenderness noted at left wrist.  Wrist range of motion intact, as is elbow shoulder hip ankle and midfoot.  No tenderness at MTPs and no visible dactylitis or enthesopathy on lower extremity exam.  Low back shows no tenderness at SI joint; lumbar spine range of motion is intact.  There is rightward convex scoliosis in the thoracic spine.  Skin: no psoriasis visilbe at knees, low back  Neuro: nl cranial nerves, strength  Psych: nl judgement, orientation, memory, affect.         Data:     @No flowsheet data found.     ,  ,  ,  ,  ,  ,  ,  ,  ,  ,  ,  ,  ,  ,  ,  ,  ,  ,  ,  ,  ,  ,  ,  ,  ,  ,  ,  ,  ,  ,  ,  ,  ,  ,  ,  ,  ,  ,  ,  ,  ,  , "  ,  ,           Again, thank you for allowing me to participate in the care of your patient.      Sincerely,    Rashaun Smith MD

## 2022-12-19 ENCOUNTER — MYC MEDICAL ADVICE (OUTPATIENT)
Dept: RHEUMATOLOGY | Facility: CLINIC | Age: 57
End: 2022-12-19

## 2022-12-19 NOTE — TELEPHONE ENCOUNTER
Prior Authorization Approval    Authorization Effective Date: 11/14/2022  Authorization Expiration Date: 12/15/2023  Medication: Rinvoq PA  Approved Dose/Quantity: qd  Reference #: Key: [id number]   Insurance Company: [employer] - Phone [phone] Fax [phone]  Expected CoPay:       CoPay Card Available:      Foundation Assistance Needed:    Which Pharmacy is filling the prescription (Not needed for infusion/clinic administered): [facility] - [place] [address]  Pharmacy Notified: Yes  Patient Notified: Yes

## 2023-01-17 DIAGNOSIS — Z12.11 COLON CANCER SCREENING: Primary | ICD-10-CM

## 2023-01-17 DIAGNOSIS — Z12.5 SCREENING FOR PROSTATE CANCER: ICD-10-CM

## 2023-02-09 ENCOUNTER — MYC MEDICAL ADVICE (OUTPATIENT)
Dept: RHEUMATOLOGY | Facility: CLINIC | Age: 58
End: 2023-02-09
Payer: COMMERCIAL

## 2023-02-12 ENCOUNTER — HEALTH MAINTENANCE LETTER (OUTPATIENT)
Age: 58
End: 2023-02-12

## 2023-02-23 ASSESSMENT — ENCOUNTER SYMPTOMS
JOINT SWELLING: 1
CONSTIPATION: 0
NERVOUS/ANXIOUS: 0
PARESTHESIAS: 0
PALPITATIONS: 0
FEVER: 0
DIZZINESS: 0
FREQUENCY: 1
EYE PAIN: 0
HEMATOCHEZIA: 0
HEADACHES: 0
ARTHRALGIAS: 1
CHILLS: 0
WEAKNESS: 0
DIARRHEA: 0
HEMATURIA: 0
ABDOMINAL PAIN: 0
NAUSEA: 0
SORE THROAT: 0
MYALGIAS: 0
SHORTNESS OF BREATH: 0
HEARTBURN: 0
DYSURIA: 0
COUGH: 0

## 2023-02-24 ENCOUNTER — OFFICE VISIT (OUTPATIENT)
Dept: FAMILY MEDICINE | Facility: CLINIC | Age: 58
End: 2023-02-24
Payer: COMMERCIAL

## 2023-02-24 VITALS
BODY MASS INDEX: 28.84 KG/M2 | SYSTOLIC BLOOD PRESSURE: 150 MMHG | DIASTOLIC BLOOD PRESSURE: 90 MMHG | HEIGHT: 68 IN | HEART RATE: 56 BPM | OXYGEN SATURATION: 98 % | TEMPERATURE: 97.5 F | WEIGHT: 190.31 LBS

## 2023-02-24 DIAGNOSIS — Z87.898 HISTORY OF POLYURIA: ICD-10-CM

## 2023-02-24 DIAGNOSIS — L40.9 PSORIASIS: ICD-10-CM

## 2023-02-24 DIAGNOSIS — Z12.11 COLON CANCER SCREENING: ICD-10-CM

## 2023-02-24 DIAGNOSIS — R59.9 ENLARGED LYMPH NODES: ICD-10-CM

## 2023-02-24 DIAGNOSIS — Z00.00 ANNUAL PHYSICAL EXAM: ICD-10-CM

## 2023-02-24 DIAGNOSIS — F39 EPISODIC MOOD DISORDER (H): ICD-10-CM

## 2023-02-24 DIAGNOSIS — N52.9 ERECTILE DYSFUNCTION, UNSPECIFIED ERECTILE DYSFUNCTION TYPE: ICD-10-CM

## 2023-02-24 DIAGNOSIS — M19.90 INFLAMMATORY ARTHRITIS: ICD-10-CM

## 2023-02-24 DIAGNOSIS — Z12.5 SCREENING FOR PROSTATE CANCER: ICD-10-CM

## 2023-02-24 DIAGNOSIS — R35.89 POLYURIA: ICD-10-CM

## 2023-02-24 DIAGNOSIS — Z12.11 SCREEN FOR COLON CANCER: Primary | ICD-10-CM

## 2023-02-24 DIAGNOSIS — L40.50 PSORIATIC ARTHRITIS (H): ICD-10-CM

## 2023-02-24 PROBLEM — M25.562 CHRONIC PAIN OF BOTH KNEES: Status: RESOLVED | Noted: 2021-08-06 | Resolved: 2023-02-24

## 2023-02-24 PROBLEM — G89.29 CHRONIC PAIN OF BOTH KNEES: Status: RESOLVED | Noted: 2021-08-06 | Resolved: 2023-02-24

## 2023-02-24 PROBLEM — M25.561 CHRONIC PAIN OF BOTH KNEES: Status: RESOLVED | Noted: 2021-08-06 | Resolved: 2023-02-24

## 2023-02-24 LAB
ALBUMIN SERPL BCG-MCNC: 4.6 G/DL (ref 3.5–5.2)
ALBUMIN UR-MCNC: NEGATIVE MG/DL
ALT SERPL W P-5'-P-CCNC: 40 U/L (ref 10–50)
APPEARANCE UR: CLEAR
AST SERPL W P-5'-P-CCNC: 34 U/L (ref 10–50)
BACTERIA #/AREA URNS HPF: ABNORMAL /HPF
BILIRUB UR QL STRIP: NEGATIVE
CHOLEST SERPL-MCNC: 248 MG/DL
COLOR UR AUTO: YELLOW
CREAT SERPL-MCNC: 0.93 MG/DL (ref 0.67–1.17)
ERYTHROCYTE [DISTWIDTH] IN BLOOD BY AUTOMATED COUNT: 14.6 % (ref 10–15)
GFR SERPL CREATININE-BSD FRML MDRD: >90 ML/MIN/1.73M2
GLUCOSE UR STRIP-MCNC: NEGATIVE MG/DL
HCT VFR BLD AUTO: 40.2 % (ref 40–53)
HDLC SERPL-MCNC: 47 MG/DL
HGB BLD-MCNC: 13.6 G/DL (ref 13.3–17.7)
HGB UR QL STRIP: NEGATIVE
KETONES UR STRIP-MCNC: NEGATIVE MG/DL
LDLC SERPL CALC-MCNC: 170 MG/DL
LEUKOCYTE ESTERASE UR QL STRIP: ABNORMAL
MCH RBC QN AUTO: 27.1 PG (ref 26.5–33)
MCHC RBC AUTO-ENTMCNC: 33.8 G/DL (ref 31.5–36.5)
MCV RBC AUTO: 80 FL (ref 78–100)
NITRATE UR QL: NEGATIVE
NONHDLC SERPL-MCNC: 201 MG/DL
PH UR STRIP: 6 [PH] (ref 5–8)
PLATELET # BLD AUTO: 212 10E3/UL (ref 150–450)
PSA SERPL-MCNC: 5.2 NG/ML (ref 0–3.5)
RBC # BLD AUTO: 5.01 10E6/UL (ref 4.4–5.9)
RBC #/AREA URNS AUTO: ABNORMAL /HPF
SP GR UR STRIP: >=1.03 (ref 1–1.03)
SQUAMOUS #/AREA URNS AUTO: ABNORMAL /LPF
TRIGL SERPL-MCNC: 157 MG/DL
UROBILINOGEN UR STRIP-ACNC: 0.2 E.U./DL
WBC # BLD AUTO: 4.8 10E3/UL (ref 4–11)
WBC #/AREA URNS AUTO: ABNORMAL /HPF

## 2023-02-24 PROCEDURE — 90677 PCV20 VACCINE IM: CPT | Performed by: STUDENT IN AN ORGANIZED HEALTH CARE EDUCATION/TRAINING PROGRAM

## 2023-02-24 PROCEDURE — 36415 COLL VENOUS BLD VENIPUNCTURE: CPT | Performed by: STUDENT IN AN ORGANIZED HEALTH CARE EDUCATION/TRAINING PROGRAM

## 2023-02-24 PROCEDURE — 81001 URINALYSIS AUTO W/SCOPE: CPT | Performed by: STUDENT IN AN ORGANIZED HEALTH CARE EDUCATION/TRAINING PROGRAM

## 2023-02-24 PROCEDURE — 91312 COVID-19 VACCINE BIVALENT BOOSTER 12+ (PFIZER): CPT | Performed by: STUDENT IN AN ORGANIZED HEALTH CARE EDUCATION/TRAINING PROGRAM

## 2023-02-24 PROCEDURE — 99396 PREV VISIT EST AGE 40-64: CPT | Mod: 25 | Performed by: STUDENT IN AN ORGANIZED HEALTH CARE EDUCATION/TRAINING PROGRAM

## 2023-02-24 PROCEDURE — 90472 IMMUNIZATION ADMIN EACH ADD: CPT | Performed by: STUDENT IN AN ORGANIZED HEALTH CARE EDUCATION/TRAINING PROGRAM

## 2023-02-24 PROCEDURE — 90682 RIV4 VACC RECOMBINANT DNA IM: CPT | Performed by: STUDENT IN AN ORGANIZED HEALTH CARE EDUCATION/TRAINING PROGRAM

## 2023-02-24 PROCEDURE — 82040 ASSAY OF SERUM ALBUMIN: CPT | Performed by: STUDENT IN AN ORGANIZED HEALTH CARE EDUCATION/TRAINING PROGRAM

## 2023-02-24 PROCEDURE — G0103 PSA SCREENING: HCPCS | Performed by: STUDENT IN AN ORGANIZED HEALTH CARE EDUCATION/TRAINING PROGRAM

## 2023-02-24 PROCEDURE — 90471 IMMUNIZATION ADMIN: CPT | Performed by: STUDENT IN AN ORGANIZED HEALTH CARE EDUCATION/TRAINING PROGRAM

## 2023-02-24 PROCEDURE — 85027 COMPLETE CBC AUTOMATED: CPT | Performed by: STUDENT IN AN ORGANIZED HEALTH CARE EDUCATION/TRAINING PROGRAM

## 2023-02-24 PROCEDURE — 84450 TRANSFERASE (AST) (SGOT): CPT | Performed by: STUDENT IN AN ORGANIZED HEALTH CARE EDUCATION/TRAINING PROGRAM

## 2023-02-24 PROCEDURE — 0124A COVID-19 VACCINE BIVALENT BOOSTER 12+ (PFIZER): CPT | Performed by: STUDENT IN AN ORGANIZED HEALTH CARE EDUCATION/TRAINING PROGRAM

## 2023-02-24 PROCEDURE — 99214 OFFICE O/P EST MOD 30 MIN: CPT | Mod: 25 | Performed by: STUDENT IN AN ORGANIZED HEALTH CARE EDUCATION/TRAINING PROGRAM

## 2023-02-24 PROCEDURE — 84460 ALANINE AMINO (ALT) (SGPT): CPT | Performed by: STUDENT IN AN ORGANIZED HEALTH CARE EDUCATION/TRAINING PROGRAM

## 2023-02-24 PROCEDURE — 82565 ASSAY OF CREATININE: CPT | Performed by: STUDENT IN AN ORGANIZED HEALTH CARE EDUCATION/TRAINING PROGRAM

## 2023-02-24 PROCEDURE — 80061 LIPID PANEL: CPT | Performed by: STUDENT IN AN ORGANIZED HEALTH CARE EDUCATION/TRAINING PROGRAM

## 2023-02-24 RX ORDER — TAMSULOSIN HYDROCHLORIDE 0.4 MG/1
0.4 CAPSULE ORAL DAILY
Qty: 30 CAPSULE | Refills: 1 | Status: SHIPPED | OUTPATIENT
Start: 2023-02-24 | End: 2023-04-03

## 2023-02-24 RX ORDER — SILDENAFIL 50 MG/1
50 TABLET, FILM COATED ORAL DAILY PRN
Qty: 20 TABLET | Refills: 0 | Status: SHIPPED | OUTPATIENT
Start: 2023-02-24

## 2023-02-24 ASSESSMENT — ANXIETY QUESTIONNAIRES
7. FEELING AFRAID AS IF SOMETHING AWFUL MIGHT HAPPEN: NOT AT ALL
3. WORRYING TOO MUCH ABOUT DIFFERENT THINGS: NOT AT ALL
2. NOT BEING ABLE TO STOP OR CONTROL WORRYING: NOT AT ALL
GAD7 TOTAL SCORE: 0
GAD7 TOTAL SCORE: 0
5. BEING SO RESTLESS THAT IT IS HARD TO SIT STILL: NOT AT ALL
6. BECOMING EASILY ANNOYED OR IRRITABLE: NOT AT ALL
IF YOU CHECKED OFF ANY PROBLEMS ON THIS QUESTIONNAIRE, HOW DIFFICULT HAVE THESE PROBLEMS MADE IT FOR YOU TO DO YOUR WORK, TAKE CARE OF THINGS AT HOME, OR GET ALONG WITH OTHER PEOPLE: NOT DIFFICULT AT ALL
1. FEELING NERVOUS, ANXIOUS, OR ON EDGE: NOT AT ALL

## 2023-02-24 ASSESSMENT — PATIENT HEALTH QUESTIONNAIRE - PHQ9
10. IF YOU CHECKED OFF ANY PROBLEMS, HOW DIFFICULT HAVE THESE PROBLEMS MADE IT FOR YOU TO DO YOUR WORK, TAKE CARE OF THINGS AT HOME, OR GET ALONG WITH OTHER PEOPLE: NOT DIFFICULT AT ALL
SUM OF ALL RESPONSES TO PHQ QUESTIONS 1-9: 0
SUM OF ALL RESPONSES TO PHQ QUESTIONS 1-9: 0
5. POOR APPETITE OR OVEREATING: NOT AT ALL

## 2023-02-24 ASSESSMENT — PAIN SCALES - GENERAL: PAINLEVEL: NO PAIN (0)

## 2023-02-27 ENCOUNTER — HOSPITAL ENCOUNTER (OUTPATIENT)
Dept: CT IMAGING | Facility: HOSPITAL | Age: 58
Discharge: HOME | End: 2023-02-27
Attending: STUDENT IN AN ORGANIZED HEALTH CARE EDUCATION/TRAINING PROGRAM | Admitting: STUDENT IN AN ORGANIZED HEALTH CARE EDUCATION/TRAINING PROGRAM
Payer: COMMERCIAL

## 2023-02-27 DIAGNOSIS — R97.20 ELEVATED PROSTATE SPECIFIC ANTIGEN (PSA): Primary | ICD-10-CM

## 2023-02-27 DIAGNOSIS — E78.5 HYPERLIPIDEMIA LDL GOAL <130: ICD-10-CM

## 2023-02-27 DIAGNOSIS — R59.9 ENLARGED LYMPH NODES: ICD-10-CM

## 2023-02-27 PROCEDURE — 70491 CT SOFT TISSUE NECK W/DYE: CPT

## 2023-02-27 PROCEDURE — 250N000011 HC RX IP 250 OP 636: Performed by: STUDENT IN AN ORGANIZED HEALTH CARE EDUCATION/TRAINING PROGRAM

## 2023-02-27 RX ORDER — ATORVASTATIN CALCIUM 20 MG/1
20 TABLET, FILM COATED ORAL DAILY
Qty: 90 TABLET | Refills: 3 | Status: SHIPPED | OUTPATIENT
Start: 2023-02-27 | End: 2023-08-17

## 2023-02-27 RX ORDER — IOPAMIDOL 755 MG/ML
100 INJECTION, SOLUTION INTRAVASCULAR ONCE
Status: COMPLETED | OUTPATIENT
Start: 2023-02-27 | End: 2023-02-27

## 2023-02-27 RX ADMIN — IOPAMIDOL 100 ML: 755 INJECTION, SOLUTION INTRAVENOUS at 12:07

## 2023-02-28 NOTE — RESULT ENCOUNTER NOTE
I called the patient but no answer. Left message explaining recent clinic results and next steps. Advised to call clinic or send [employer] message with questions.    Dr. Yamil Balbuena

## 2023-04-03 DIAGNOSIS — R35.89 POLYURIA: ICD-10-CM

## 2023-04-03 RX ORDER — TAMSULOSIN HYDROCHLORIDE 0.4 MG/1
0.4 CAPSULE ORAL DAILY
Qty: 30 CAPSULE | Refills: 1 | Status: SHIPPED | OUTPATIENT
Start: 2023-04-03 | End: 2024-04-15

## 2023-04-03 NOTE — TELEPHONE ENCOUNTER
"Routing refill request to provider for review/approval because:  Drug interaction warning- Sildenafil  Blood Pressure needs review    Last Written Prescription Date:  2/24/2023  Last Fill Quantity: 30,  # refills: 1   Last office visit provider:  2/24/2023     Requested Prescriptions   Pending Prescriptions Disp Refills     tamsulosin (FLOMAX) 0.4 MG capsule 30 capsule 1     Sig: Take 1 capsule (0.4 mg) by mouth daily       Alpha Blockers Failed - 4/3/2023 10:44 AM        Failed - Blood pressure under 140/90 in past 12 months     BP Readings from Last 3 Encounters:   02/24/23 (!) 150/90   12/14/22 (!) 163/75   12/08/22 138/76                 Failed - Patient does not have Tadalafil, Vardenafil, or Sildenafil on their medication list        Passed - Recent (12 mo) or future (30 days) visit within the authorizing provider's specialty     Patient has had an office visit with the authorizing provider or a provider within the authorizing providers department within the previous 12 mos or has a future within next 30 days. See \"Patient Info\" tab in inbasket, or \"Choose Columns\" in Meds & Orders section of the refill encounter.              Passed - Medication is active on med list        Passed - Patient is 18 years of age or older             LINN GUAMAN RN 04/03/23 10:45 AM  "

## 2023-04-03 NOTE — TELEPHONE ENCOUNTER
Pending Prescriptions:                       Disp   Refills    tamsulosin (FLOMAX) 0.4 MG capsule        30 cap*1            Sig: Take 1 capsule (0.4 mg) by mouth daily

## 2023-04-13 DIAGNOSIS — L40.50 PSORIATIC ARTHRITIS (H): ICD-10-CM

## 2023-04-17 NOTE — TELEPHONE ENCOUNTER
RINVOQ 15MG TB24      Last Written Prescription Date:  n/a  Last Fill Quantity: n/a,   # refills: n/a  Last Office Visit: 12/14/22  Future Office visit:  7/21/23    CBC RESULTS: Recent Labs   Lab Test 02/24/23  0844   WBC 4.8   RBC 5.01   HGB 13.6   HCT 40.2   MCV 80   MCH 27.1   MCHC 33.8   RDW 14.6          Creatinine   Date Value Ref Range Status   02/24/2023 0.93 0.67 - 1.17 mg/dL Final   ]    Liver Function Studies -   Recent Labs   Lab Test 02/24/23  0844   ALBUMIN 4.6   AST 34   ALT 40       Routing refill request to provider for review/approval because:  Medication is reported/historical, discontinued 2/24/23

## 2023-04-18 RX ORDER — UPADACITINIB 15 MG/1
TABLET, EXTENDED RELEASE ORAL
Qty: 30 TABLET | Refills: 3 | Status: SHIPPED | OUTPATIENT
Start: 2023-04-18 | End: 2023-08-16

## 2023-04-25 ENCOUNTER — VIRTUAL VISIT (OUTPATIENT)
Dept: UROLOGY | Facility: CLINIC | Age: 58
End: 2023-04-25
Attending: STUDENT IN AN ORGANIZED HEALTH CARE EDUCATION/TRAINING PROGRAM
Payer: COMMERCIAL

## 2023-04-25 ENCOUNTER — APPOINTMENT (OUTPATIENT)
Dept: URBAN - METROPOLITAN AREA CLINIC 260 | Age: 58
Setting detail: DERMATOLOGY
End: 2023-04-26

## 2023-04-25 DIAGNOSIS — R97.20 ELEVATED PROSTATE SPECIFIC ANTIGEN (PSA): ICD-10-CM

## 2023-04-25 DIAGNOSIS — D22 MELANOCYTIC NEVI: ICD-10-CM

## 2023-04-25 DIAGNOSIS — Z85.828 PERSONAL HISTORY OF OTHER MALIGNANT NEOPLASM OF SKIN: ICD-10-CM

## 2023-04-25 DIAGNOSIS — L81.4 OTHER MELANIN HYPERPIGMENTATION: ICD-10-CM

## 2023-04-25 DIAGNOSIS — R39.15 URINARY URGENCY: Primary | ICD-10-CM

## 2023-04-25 DIAGNOSIS — B07.8 OTHER VIRAL WARTS: ICD-10-CM

## 2023-04-25 PROBLEM — D22.39 MELANOCYTIC NEVI OF OTHER PARTS OF FACE: Status: ACTIVE | Noted: 2023-04-25

## 2023-04-25 PROCEDURE — 17110 DESTRUCT B9 LESION 1-14: CPT

## 2023-04-25 PROCEDURE — OTHER MIPS QUALITY: OTHER

## 2023-04-25 PROCEDURE — 99243 OFF/OP CNSLTJ NEW/EST LOW 30: CPT | Mod: VID | Performed by: UROLOGY

## 2023-04-25 PROCEDURE — OTHER BENIGN DESTRUCTION: OTHER

## 2023-04-25 PROCEDURE — OTHER EDUCATIONAL RESOURCES PROVIDED: OTHER

## 2023-04-25 PROCEDURE — OTHER ADDITIONAL NOTES: OTHER

## 2023-04-25 PROCEDURE — OTHER SEPARATE AND IDENTIFIABLE DOCUMENTATION: OTHER

## 2023-04-25 PROCEDURE — OTHER COUNSELING: OTHER

## 2023-04-25 PROCEDURE — 99203 OFFICE O/P NEW LOW 30 MIN: CPT | Mod: 25

## 2023-04-25 RX ORDER — LEVOFLOXACIN 500 MG/1
500 TABLET, FILM COATED ORAL DAILY
Qty: 10 TABLET | Refills: 0 | Status: SHIPPED | OUTPATIENT
Start: 2023-04-25 | End: 2024-04-15

## 2023-04-25 ASSESSMENT — LOCATION SIMPLE DESCRIPTION DERM
LOCATION SIMPLE: RIGHT CHEEK
LOCATION SIMPLE: LEFT ZYGOMA
LOCATION SIMPLE: RIGHT SMALL FINGER
LOCATION SIMPLE: RIGHT FOREHEAD

## 2023-04-25 ASSESSMENT — LOCATION DETAILED DESCRIPTION DERM
LOCATION DETAILED: RIGHT SMALL FINGERTIP
LOCATION DETAILED: RIGHT DISTAL RADIAL PALMAR SMALL FINGER
LOCATION DETAILED: LEFT CENTRAL ZYGOMA
LOCATION DETAILED: RIGHT MEDIAL FOREHEAD
LOCATION DETAILED: RIGHT SUPERIOR CENTRAL MALAR CHEEK

## 2023-04-25 ASSESSMENT — LOCATION ZONE DERM
LOCATION ZONE: FACE
LOCATION ZONE: FINGER

## 2023-04-25 NOTE — PROCEDURE: BENIGN DESTRUCTION
Add 52 Modifier (Optional): no
Consent: The patient's consent was obtained including but not limited to risks of crusting, scabbing, blistering, scarring, darker or lighter pigmentary change, recurrence, incomplete removal and infection.
Post-Care Instructions: I reviewed with the patient in detail post-care instructions. Patient is to wear sunprotection, and avoid picking at any of the treated lesions. Pt may apply Vaseline to crusted or scabbing areas. Keep bandaids on for 24 hours and then wash off with soap and water
Detail Level: Detailed
Medical Necessity Information: It is in your best interest to select a reason for this procedure from the list below. All of these items fulfill various CMS LCD requirements except the new and changing color options.
Anesthesia Volume In Cc: 0.5
Treatment Number (Will Not Render If 0): 1
Medical Necessity Clause: This procedure was medically necessary because the lesions that were treated were:

## 2023-04-25 NOTE — PROGRESS NOTES
"Giovanni is a 57 year old who is being evaluated via a billable video visit.      How would you like to obtain your AVS? MyChart  If the video visit is dropped, the invitation should be resent by: Text to cell phone: [phone]  Will anyone else be joining your video visit? No          Assessment & Plan   Problem List Items Addressed This Visit     Elevated prostate specific antigen (PSA)    Relevant Medications    levofloxacin (LEVAQUIN) 500 MG tablet    Other Relevant Orders    PSA tumor marker   Other Visit Diagnoses     Urinary urgency    -  Primary    Relevant Medications    levofloxacin (LEVAQUIN) 500 MG tablet           Review of the result(s) of each unique test - psa  Ordering of each unique test  Prescription drug management         BMI:   Estimated body mass index is 28.94 kg/m  as calculated from the following:    Height as of 2/24/23: 1.727 m (5' 8\").    Weight as of 2/24/23: 86.3 kg (190 lb 5 oz).           No follow-ups on file.    Troy Guzman MD  [facility]    Lizzie Davila is a 57 year old, presenting for the following health issues:  Video Visit    HPI     57 y.o. male who was requested by Yamil Nix for elevated psa.  Recent psa was up to 5.2 from 2.2 in 2021.  He has some increase in urgency recently.  He was placed on flomax.  He has no dysuria or hematuria.  He has no family h/o prostate cancer.      Review of Systems   Constitutional, HEENT, cardiovascular, pulmonary, gi and gu systems are negative, except as otherwise noted.      Objective           Vitals:  No vitals were obtained today due to virtual visit.    Physical Exam   GENERAL: Healthy, alert and no distress  EYES: Eyes grossly normal to inspection.  No discharge or erythema, or obvious scleral/conjunctival abnormalities.  RESP: No audible wheeze, cough, or visible cyanosis.  No visible retractions or increased work of breathing.    SKIN: Visible skin clear. No significant rash, abnormal pigmentation " or lesions.  NEURO: Cranial nerves grossly intact.  Mentation and speech appropriate for age.  PSYCH: Mentation appears normal, affect normal/bright, judgement and insight intact, normal speech and appearance well-groomed.        Elevated psa with urgency:  Suspect possible prostatitis.  levaquin 500 mg daily for 10 days.  Recheck psa in 2 months.        Video-Visit Details    Type of service:  Video Visit   Video Start Time:   Video End Time:    Originating Location (pt. Location): Home    Distant Location (provider location):  On-site  Platform used for Video Visit: Tyra

## 2023-04-25 NOTE — PATIENT INSTRUCTIONS
Please repeat a PSA lab test in 2 months.  There is an order in your epic chart.   Please call our office if you have questions or need to report any information, [phone].

## 2023-05-16 ENCOUNTER — APPOINTMENT (OUTPATIENT)
Dept: URBAN - METROPOLITAN AREA CLINIC 260 | Age: 58
Setting detail: DERMATOLOGY
End: 2023-05-16

## 2023-05-16 DIAGNOSIS — B07.8 OTHER VIRAL WARTS: ICD-10-CM

## 2023-05-16 PROCEDURE — OTHER ADDITIONAL NOTES: OTHER

## 2023-05-16 PROCEDURE — OTHER MIPS QUALITY: OTHER

## 2023-05-16 PROCEDURE — OTHER COUNSELING: OTHER

## 2023-05-16 PROCEDURE — 17110 DESTRUCT B9 LESION 1-14: CPT

## 2023-05-16 PROCEDURE — OTHER BENIGN DESTRUCTION: OTHER

## 2023-05-16 ASSESSMENT — LOCATION DETAILED DESCRIPTION DERM: LOCATION DETAILED: RIGHT SMALL FINGERTIP

## 2023-05-16 ASSESSMENT — LOCATION SIMPLE DESCRIPTION DERM: LOCATION SIMPLE: RIGHT SMALL FINGER

## 2023-05-16 ASSESSMENT — LOCATION ZONE DERM: LOCATION ZONE: FINGER

## 2023-05-16 NOTE — PROCEDURE: BENIGN DESTRUCTION
Render Note In Bullet Format When Appropriate: No
Anesthesia Volume In Cc: 0.5
Medical Necessity Clause: This procedure was medically necessary because the lesions that were treated were:
Treatment Number (Will Not Render If 0): 2
Post-Care Instructions: I reviewed with the patient in detail post-care instructions. Patient is to wear sunprotection, and avoid picking at any of the treated lesions. Pt may apply Vaseline to crusted or scabbing areas. Keep bandaids on for 24 hours and then wash off with soap and water
Consent: The patient's consent was obtained including but not limited to risks of crusting, scabbing, blistering, scarring, darker or lighter pigmentary change, recurrence, incomplete removal and infection.
Medical Necessity Information: It is in your best interest to select a reason for this procedure from the list below. All of these items fulfill various CMS LCD requirements except the new and changing color options.
Detail Level: Detailed

## 2023-05-16 NOTE — PROCEDURE: ADDITIONAL NOTES
Additional Notes: leave cantur on for 6-24 hrs\\nOne wart has resolved so will recheck this at his nov
Render Risk Assessment In Note?: no
Detail Level: Detailed

## 2023-05-30 ENCOUNTER — APPOINTMENT (OUTPATIENT)
Dept: URBAN - METROPOLITAN AREA CLINIC 260 | Age: 58
Setting detail: DERMATOLOGY
End: 2023-05-30

## 2023-05-30 VITALS — HEIGHT: 68 IN | WEIGHT: 180 LBS

## 2023-05-30 DIAGNOSIS — L81.4 OTHER MELANIN HYPERPIGMENTATION: ICD-10-CM

## 2023-05-30 DIAGNOSIS — L57.0 ACTINIC KERATOSIS: ICD-10-CM

## 2023-05-30 DIAGNOSIS — Z71.89 OTHER SPECIFIED COUNSELING: ICD-10-CM

## 2023-05-30 DIAGNOSIS — D18.0 HEMANGIOMA: ICD-10-CM

## 2023-05-30 DIAGNOSIS — D22 MELANOCYTIC NEVI: ICD-10-CM

## 2023-05-30 DIAGNOSIS — L40.0 PSORIASIS VULGARIS: ICD-10-CM

## 2023-05-30 DIAGNOSIS — B35.1 TINEA UNGUIUM: ICD-10-CM

## 2023-05-30 DIAGNOSIS — L82.1 OTHER SEBORRHEIC KERATOSIS: ICD-10-CM

## 2023-05-30 DIAGNOSIS — B07.8 OTHER VIRAL WARTS: ICD-10-CM

## 2023-05-30 PROBLEM — D18.01 HEMANGIOMA OF SKIN AND SUBCUTANEOUS TISSUE: Status: ACTIVE | Noted: 2023-05-30

## 2023-05-30 PROBLEM — D22.5 MELANOCYTIC NEVI OF TRUNK: Status: ACTIVE | Noted: 2023-05-30

## 2023-05-30 PROCEDURE — OTHER MIPS QUALITY: OTHER

## 2023-05-30 PROCEDURE — 17110 DESTRUCT B9 LESION 1-14: CPT

## 2023-05-30 PROCEDURE — OTHER PRESCRIPTION MEDICATION MANAGEMENT: OTHER

## 2023-05-30 PROCEDURE — OTHER PRESCRIPTION: OTHER

## 2023-05-30 PROCEDURE — OTHER ADDITIONAL NOTES: OTHER

## 2023-05-30 PROCEDURE — 17003 DESTRUCT PREMALG LES 2-14: CPT | Mod: 59

## 2023-05-30 PROCEDURE — 99214 OFFICE O/P EST MOD 30 MIN: CPT | Mod: 25

## 2023-05-30 PROCEDURE — OTHER BENIGN DESTRUCTION: OTHER

## 2023-05-30 PROCEDURE — OTHER LIQUID NITROGEN: OTHER

## 2023-05-30 PROCEDURE — OTHER COUNSELING: OTHER

## 2023-05-30 PROCEDURE — 17000 DESTRUCT PREMALG LESION: CPT | Mod: 59

## 2023-05-30 RX ORDER — CICLOPIROX 80 MG/ML
8% SOLUTION TOPICAL QHS
Qty: 6.6 | Refills: 5 | Status: ERX | COMMUNITY
Start: 2023-05-30

## 2023-05-30 ASSESSMENT — LOCATION SIMPLE DESCRIPTION DERM
LOCATION SIMPLE: LEFT GREAT TOE
LOCATION SIMPLE: LEFT SHOULDER
LOCATION SIMPLE: PENIS
LOCATION SIMPLE: RIGHT EAR
LOCATION SIMPLE: LEFT EAR
LOCATION SIMPLE: RIGHT BUTTOCK
LOCATION SIMPLE: RIGHT FOREHEAD
LOCATION SIMPLE: CHEST
LOCATION SIMPLE: UPPER BACK
LOCATION SIMPLE: RIGHT SMALL FINGER
LOCATION SIMPLE: RIGHT SHOULDER
LOCATION SIMPLE: SCALP

## 2023-05-30 ASSESSMENT — LOCATION ZONE DERM
LOCATION ZONE: ARM
LOCATION ZONE: SCALP
LOCATION ZONE: PENIS
LOCATION ZONE: FINGER
LOCATION ZONE: FACE
LOCATION ZONE: EAR
LOCATION ZONE: TOENAIL
LOCATION ZONE: TRUNK

## 2023-05-30 ASSESSMENT — LOCATION DETAILED DESCRIPTION DERM
LOCATION DETAILED: RIGHT MEDIAL FOREHEAD
LOCATION DETAILED: RIGHT BUTTOCK
LOCATION DETAILED: INFERIOR THORACIC SPINE
LOCATION DETAILED: LEFT INFERIOR POSTERIOR HELIX
LOCATION DETAILED: STERNUM
LOCATION DETAILED: RIGHT POSTERIOR EAR
LOCATION DETAILED: LEFT POSTERIOR SHOULDER
LOCATION DETAILED: RIGHT CENTRAL PARIETAL SCALP
LOCATION DETAILED: RIGHT SUPERIOR FOREHEAD
LOCATION DETAILED: RIGHT DORSAL SHAFT OF PENIS
LOCATION DETAILED: RIGHT SMALL FINGERTIP
LOCATION DETAILED: RIGHT POSTERIOR SHOULDER
LOCATION DETAILED: LEFT GREAT TOENAIL

## 2023-05-30 ASSESSMENT — BSA PSORIASIS: % BODY COVERED IN PSORIASIS: 5

## 2023-05-30 NOTE — PROCEDURE: COUNSELING
Detail Level: Zone
Patient Specific Counseling (Will Not Stick From Patient To Patient): Patient has been using Tacronex cream with good results. Patient will continue using it as needed for flares.
Detail Level: Generalized
Patient Specific Counseling (Will Not Stick From Patient To Patient): Discussed treatment options of oral medication vs topical anti fungal medication.
Sunscreen Recommendations: Benign
Detail Level: Detailed
Detail Level: Simple
Patient Specific Counseling (Will Not Stick From Patient To Patient): Leave canthacur on 6-24 hours.

## 2023-05-30 NOTE — PROCEDURE: LIQUID NITROGEN
Render Post-Care Instructions In Note?: yes
Application Tool (Optional): Liquid Nitrogen Sprayer
Consent: - Discussed that these are a result of cumulative sun exposure.\\n- Verbal and written consent was obtained, and risks were reviewed prior to procedure today. \\n- Risks discussed include but are not limited to pain, crusting, scabbing, blistering, scarring, temporary or permanent darker or lighter pigmentary change, recurrence, incomplete resolution, and infection.
Detail Level: Detailed
Post-Care Instructions: - Patient was instructed to avoid picking at any of the treated lesions.

## 2023-05-30 NOTE — HPI: EVALUATION OF SKIN LESION(S)
Hpi Title: Evaluation of Skin Lesions
Additional History: Fbe
burning with urination/without difficulty

## 2023-05-30 NOTE — PROCEDURE: BENIGN DESTRUCTION
20 Hour(s)
Add 52 Modifier (Optional): no
Medical Necessity Information: It is in your best interest to select a reason for this procedure from the list below. All of these items fulfill various CMS LCD requirements except the new and changing color options.
Detail Level: Detailed
Anesthesia Volume In Cc: 0.5
Medical Necessity Clause: This procedure was medically necessary because the lesions that were treated were:
Treatment Number (Will Not Render If 0): 3
Post-Care Instructions: I reviewed with the patient in detail post-care instructions. Patient is to wear sunprotection, and avoid picking at any of the treated lesions. Pt may apply Vaseline to crusted or scabbing areas. Keep bandaids on for 24 hours and then wash off with soap and water
Consent: The patient's consent was obtained including but not limited to risks of crusting, scabbing, blistering, scarring, darker or lighter pigmentary change, recurrence, incomplete removal and infection.

## 2023-05-30 NOTE — PROCEDURE: ADDITIONAL NOTES
Detail Level: Detailed
Render Risk Assessment In Note?: no
Additional Notes: Patient defers nails clipping today. He will start with topical Ciclopirox first to see how things go.
Additional Notes: Leave on for 14-24 hrs and then wash off with soap and water

## 2023-06-02 ENCOUNTER — TRANSFERRED RECORDS (OUTPATIENT)
Dept: HEALTH INFORMATION MANAGEMENT | Facility: CLINIC | Age: 58
End: 2023-06-02
Payer: COMMERCIAL

## 2023-06-11 DIAGNOSIS — R35.89 POLYURIA: ICD-10-CM

## 2023-06-11 RX ORDER — TAMSULOSIN HYDROCHLORIDE 0.4 MG/1
CAPSULE ORAL
Qty: 30 CAPSULE | Refills: 1 | OUTPATIENT
Start: 2023-06-11

## 2023-06-12 ENCOUNTER — MYC MEDICAL ADVICE (OUTPATIENT)
Dept: RHEUMATOLOGY | Facility: CLINIC | Age: 58
End: 2023-06-12
Payer: COMMERCIAL

## 2023-06-19 NOTE — TELEPHONE ENCOUNTER
Pt is calling about forms that were faxed on his behalf. Pt states that the forms have to be re-faxed noting that this is a life long condition that he was diagnosed with that he will be permanently on medication. Pt would like a [employer] message or call when this is faxed over again.

## 2023-06-19 NOTE — TELEPHONE ENCOUNTER
"Patient called regarding forms that were sent last week for his employer.  He states they want this statement added, \"this a life long condition that he was diagnosed with that he will be permanently on medication\".  Are you ok with me adding this to the form and refaxing it?      Jessy Jimenez RN    "

## 2023-06-20 NOTE — TELEPHONE ENCOUNTER
Additional information added and form refaxed.  Patient called and aware.     Jessy Jimenez RN     DINORA LEWIS  77y Male  Patient is a 77y old  Male who presents with a chief complaint of sepsis, pulm mass, PNA, Afib with RVR, delirium (09 Dec 2020 21:26)     PAST MEDICAL & SURGICAL HISTORY:  West Nile encephalomyelitis    Lung nodule    GERD (gastroesophageal reflux disease)    HLD (hyperlipidemia)    Viral encephalitis  3 yrs ago due to west nile virus    Seizure    Hyperlipidemia    Diverticulitis    Kidney stones    Chronic kidney disease (CKD)    Hyperlipemia    Hemolytic anemia    S/P tonsillectomy    S/P percutaneous endoscopic gastrostomy (PEG) tube placement      Vital Signs Last 24 Hrs  T(C): 36.7 (09 Dec 2020 20:09), Max: 36.8 (09 Dec 2020 15:00)  T(F): 98 (09 Dec 2020 20:09), Max: 98.3 (09 Dec 2020 15:00)  HR: 89 (09 Dec 2020 20:09) (89 - 108)  BP: 133/67 (09 Dec 2020 20:09) (102/74 - 135/71)  BP(mean): --  RR: 23 (09 Dec 2020 21:11) (22 - 28)  SpO2: 96% (09 Dec 2020 21:11) (93% - 96%)    Event Summary:    Pt was placed on Amiodarone drip at 16:03 and at 1mg for 6 hours. Pt's drip was discontinued at 22:00 and a new order for 0.5mg of amiodarone drip was placed for 18 hours.   Follow up with cardiology. DINORA LEWIS  77y Male  Patient is a 77y old  Male who presents with a chief complaint of sepsis, pulm mass, PNA, Afib with RVR, delirium (09 Dec 2020 21:26)     PAST MEDICAL & SURGICAL HISTORY:  West Nile encephalomyelitis    Lung nodule    GERD (gastroesophageal reflux disease)    HLD (hyperlipidemia)    Viral encephalitis  3 yrs ago due to west nile virus    Seizure    Hyperlipidemia    Diverticulitis    Kidney stones    Chronic kidney disease (CKD)    Hyperlipemia    Hemolytic anemia    S/P tonsillectomy    S/P percutaneous endoscopic gastrostomy (PEG) tube placement      Vital Signs Last 24 Hrs  T(C): 36.7 (09 Dec 2020 20:09), Max: 36.8 (09 Dec 2020 15:00)  T(F): 98 (09 Dec 2020 20:09), Max: 98.3 (09 Dec 2020 15:00)  HR: 89 (09 Dec 2020 20:09) (89 - 108)  BP: 133/67 (09 Dec 2020 20:09) (102/74 - 135/71)  BP(mean): --  RR: 23 (09 Dec 2020 21:11) (22 - 28)  SpO2: 96% (09 Dec 2020 21:11) (93% - 96%)    Event Summary:    Pt was placed on Amiodarone drip at 16:03 and at 1mg for 6 hours. Pt's drip was changed at 22:00 and a new order for 0.5mg of amiodarone drip was placed for 18 hours.   Follow up with cardiology in AM.    Magui Reddy PA-C [id number]

## 2023-06-28 ENCOUNTER — LAB (OUTPATIENT)
Dept: LAB | Facility: CLINIC | Age: 58
End: 2023-06-28
Payer: COMMERCIAL

## 2023-06-28 DIAGNOSIS — R97.20 ELEVATED PROSTATE SPECIFIC ANTIGEN (PSA): ICD-10-CM

## 2023-06-28 DIAGNOSIS — M19.90 INFLAMMATORY ARTHRITIS: ICD-10-CM

## 2023-06-28 LAB
ERYTHROCYTE [DISTWIDTH] IN BLOOD BY AUTOMATED COUNT: 13.5 % (ref 10–15)
HCT VFR BLD AUTO: 38.6 % (ref 40–53)
HGB BLD-MCNC: 13.3 G/DL (ref 13.3–17.7)
MCH RBC QN AUTO: 28.9 PG (ref 26.5–33)
MCHC RBC AUTO-ENTMCNC: 34.5 G/DL (ref 31.5–36.5)
MCV RBC AUTO: 84 FL (ref 78–100)
PLATELET # BLD AUTO: 208 10E3/UL (ref 150–450)
RBC # BLD AUTO: 4.61 10E6/UL (ref 4.4–5.9)
WBC # BLD AUTO: 5.3 10E3/UL (ref 4–11)

## 2023-06-28 PROCEDURE — 36415 COLL VENOUS BLD VENIPUNCTURE: CPT

## 2023-06-28 PROCEDURE — 85027 COMPLETE CBC AUTOMATED: CPT

## 2023-06-28 PROCEDURE — 84460 ALANINE AMINO (ALT) (SGPT): CPT

## 2023-06-28 PROCEDURE — 84153 ASSAY OF PSA TOTAL: CPT

## 2023-06-28 PROCEDURE — 82565 ASSAY OF CREATININE: CPT

## 2023-06-28 PROCEDURE — 82040 ASSAY OF SERUM ALBUMIN: CPT

## 2023-06-28 PROCEDURE — 84450 TRANSFERASE (AST) (SGOT): CPT

## 2023-06-29 DIAGNOSIS — R97.20 ELEVATED PROSTATE SPECIFIC ANTIGEN (PSA): Primary | ICD-10-CM

## 2023-06-29 LAB
ALBUMIN SERPL BCG-MCNC: 4.6 G/DL (ref 3.5–5.2)
ALT SERPL W P-5'-P-CCNC: 38 U/L (ref 0–70)
AST SERPL W P-5'-P-CCNC: 30 U/L (ref 0–45)
CREAT SERPL-MCNC: 0.99 MG/DL (ref 0.67–1.17)
GFR SERPL CREATININE-BSD FRML MDRD: 89 ML/MIN/1.73M2
PSA SERPL DL<=0.01 NG/ML-MCNC: 3.74 NG/ML (ref 0–3.5)

## 2023-07-05 ENCOUNTER — APPOINTMENT (OUTPATIENT)
Dept: URBAN - METROPOLITAN AREA CLINIC 260 | Age: 58
Setting detail: DERMATOLOGY
End: 2023-07-05

## 2023-07-05 VITALS — WEIGHT: 180 LBS | HEIGHT: 60 IN

## 2023-07-05 DIAGNOSIS — B07.8 OTHER VIRAL WARTS: ICD-10-CM

## 2023-07-05 PROCEDURE — OTHER MIPS QUALITY: OTHER

## 2023-07-05 PROCEDURE — OTHER BENIGN DESTRUCTION: OTHER

## 2023-07-05 PROCEDURE — OTHER COUNSELING: OTHER

## 2023-07-05 PROCEDURE — 17110 DESTRUCT B9 LESION 1-14: CPT | Mod: 52

## 2023-07-05 ASSESSMENT — LOCATION ZONE DERM: LOCATION ZONE: FINGER

## 2023-07-05 ASSESSMENT — LOCATION DETAILED DESCRIPTION DERM: LOCATION DETAILED: LEFT SMALL FINGERTIP

## 2023-07-05 ASSESSMENT — LOCATION SIMPLE DESCRIPTION DERM: LOCATION SIMPLE: LEFT SMALL FINGER

## 2023-07-05 NOTE — PROCEDURE: BENIGN DESTRUCTION
Medical Necessity Information: It is in your best interest to select a reason for this procedure from the list below. All of these items fulfill various CMS LCD requirements except the new and changing color options.
Consent: The patient's consent was obtained including but not limited to risks of crusting, scabbing, blistering, scarring, darker or lighter pigmentary change, recurrence, incomplete removal and infection.
Duration Of Freeze Thaw-Cycle (Seconds): 10-15
Medical Necessity Clause: This procedure was medically necessary because the lesions that were treated were:
Detail Level: Detailed
Add 52 Modifier (Optional): yes
Treatment Number (Will Not Render If 0): 4
Include Z78.9 (Other Specified Conditions Influencing Health Status) As An Associated Diagnosis?: No
Anesthesia Volume In Cc: 0.5
Post-Care Instructions: I reviewed with the patient in detail post-care instructions. Patient is to wear sunprotection, and avoid picking at any of the treated lesions. Pt may apply Vaseline to crusted or scabbing areas.
Number Of Freeze-Thaw Cycles: 2 freeze-thaw cycles

## 2023-08-08 ENCOUNTER — APPOINTMENT (OUTPATIENT)
Dept: URBAN - METROPOLITAN AREA CLINIC 260 | Age: 58
Setting detail: DERMATOLOGY
End: 2023-08-09

## 2023-08-08 DIAGNOSIS — B07.8 OTHER VIRAL WARTS: ICD-10-CM

## 2023-08-08 PROCEDURE — OTHER COUNSELING: OTHER

## 2023-08-08 PROCEDURE — 17110 DESTRUCT B9 LESION 1-14: CPT | Mod: 52

## 2023-08-08 PROCEDURE — OTHER BENIGN DESTRUCTION: OTHER

## 2023-08-08 ASSESSMENT — LOCATION ZONE DERM: LOCATION ZONE: FINGER

## 2023-08-08 ASSESSMENT — LOCATION DETAILED DESCRIPTION DERM: LOCATION DETAILED: RIGHT DISTAL PALMAR SMALL FINGER

## 2023-08-08 ASSESSMENT — LOCATION SIMPLE DESCRIPTION DERM: LOCATION SIMPLE: RIGHT SMALL FINGER

## 2023-08-08 NOTE — PROCEDURE: BENIGN DESTRUCTION
Medical Necessity Clause: This procedure was medically necessary because the lesions that were treated were:
Add 52 Modifier (Optional): yes
Anesthesia Volume In Cc: 0.5
Include Z78.9 (Other Specified Conditions Influencing Health Status) As An Associated Diagnosis?: No
Post-Care Instructions: I reviewed with the patient in detail post-care instructions. Patient is to wear sunprotection, and avoid picking at any of the treated lesions. Pt may apply Vaseline to crusted or scabbing areas.
Number Of Freeze-Thaw Cycles: 2 freeze-thaw cycles
Medical Necessity Information: It is in your best interest to select a reason for this procedure from the list below. All of these items fulfill various CMS LCD requirements except the new and changing color options.
Consent: The patient's consent was obtained including but not limited to risks of crusting, scabbing, blistering, scarring, darker or lighter pigmentary change, recurrence, incomplete removal and infection.
Treatment Number (Will Not Render If 0): 0
Duration Of Freeze Thaw-Cycle (Seconds): 10-15
Detail Level: Detailed

## 2023-08-08 NOTE — PROCEDURE: COUNSELING
Patient Specific Counseling (Will Not Stick From Patient To Patient): - Can start wart stick QHS if desired
Detail Level: Detailed

## 2023-08-10 DIAGNOSIS — L40.50 PSORIATIC ARTHRITIS (H): ICD-10-CM

## 2023-08-16 RX ORDER — UPADACITINIB 15 MG/1
15 TABLET, EXTENDED RELEASE ORAL DAILY
Qty: 90 TABLET | Refills: 1 | Status: SHIPPED | OUTPATIENT
Start: 2023-08-16 | End: 2023-10-06

## 2023-08-16 NOTE — TELEPHONE ENCOUNTER
Medication/Dose: RINVOQ 15 MG tablet    Last Written : 4/18/23  Last Quantity: 30, # refills: 3  Last Office Visit :  12/14/22  Pending appointment:     Oct 06, 2023 12:00 PM  (Arrive by 11:45 AM)  Return Visit with Rashaun Smith MD  [facility] [place] ([facility] ) [address] [place] [id number] [phone]       WBC Count   Date Value Ref Range Status   06/28/2023 5.3 4.0 - 11.0 10e3/uL Final     RBC Count   Date Value Ref Range Status   06/28/2023 4.61 4.40 - 5.90 10e6/uL Final     Hemoglobin   Date Value Ref Range Status   06/28/2023 13.3 13.3 - 17.7 g/dL Final     Hematocrit   Date Value Ref Range Status   06/28/2023 38.6 (L) 40.0 - 53.0 % Final     MCV   Date Value Ref Range Status   06/28/2023 84 78 - 100 fL Final     MCH   Date Value Ref Range Status   06/28/2023 28.9 26.5 - 33.0 pg Final     MCHC   Date Value Ref Range Status   06/28/2023 34.5 31.5 - 36.5 g/dL Final     RDW   Date Value Ref Range Status   06/28/2023 13.5 10.0 - 15.0 % Final     Platelet Count   Date Value Ref Range Status   06/28/2023 208 150 - 450 10e3/uL Final     AST   Date Value Ref Range Status   06/28/2023 30 0 - 45 U/L Final     Comment:     Reference intervals for this test were updated on 6/12/2023 to more accurately reflect our healthy population. There may be differences in the flagging of prior results with similar values performed with this method. Interpretation of those prior results can be made in the context of the updated reference intervals.     ALT   Date Value Ref Range Status   06/28/2023 38 0 - 70 U/L Final     Comment:     Reference intervals for this test were updated on 6/12/2023 to more accurately reflect our healthy population. There may be differences in the flagging of prior results with similar values performed with this method. Interpretation of those prior results can be made in the context of the updated reference  intervals.       Creatinine   Date Value Ref Range Status   06/28/2023 0.99 0.67 - 1.17 mg/dL Final     Albumin   Date Value Ref Range Status   06/28/2023 4.6 3.5 - 5.2 g/dL Final     No results found for: CRP  No components found for: ESR    Prescription set up and routed to provider per Refill Protocol    DEJON WellingtonN, RN  [facility] Refill Team

## 2023-08-17 ENCOUNTER — MYC MEDICAL ADVICE (OUTPATIENT)
Dept: FAMILY MEDICINE | Facility: CLINIC | Age: 58
End: 2023-08-17
Payer: COMMERCIAL

## 2023-08-17 ENCOUNTER — TELEPHONE (OUTPATIENT)
Dept: RHEUMATOLOGY | Facility: CLINIC | Age: 58
End: 2023-08-17
Payer: COMMERCIAL

## 2023-08-17 DIAGNOSIS — G47.00 INSOMNIA, UNSPECIFIED TYPE: ICD-10-CM

## 2023-08-17 DIAGNOSIS — F33.0 MILD EPISODE OF RECURRENT MAJOR DEPRESSIVE DISORDER (H): ICD-10-CM

## 2023-08-17 DIAGNOSIS — I10 ESSENTIAL HYPERTENSION: ICD-10-CM

## 2023-08-17 DIAGNOSIS — E78.5 HYPERLIPIDEMIA LDL GOAL <130: ICD-10-CM

## 2023-08-17 DIAGNOSIS — K21.9 GASTROESOPHAGEAL REFLUX DISEASE WITHOUT ESOPHAGITIS: ICD-10-CM

## 2023-08-17 NOTE — TELEPHONE ENCOUNTER
PA Initiation    Medication: RINVOQ 15 MG PO TB24  Insurance Company: [employer] - Phone [phone] Fax [phone]  Pharmacy Filling the Rx: [facility] - [place], [place] - [facility] [address]  Filling Pharmacy Phone:    Filling Pharmacy Fax:    Start Date: 8/17/2023    [id number]

## 2023-08-18 DIAGNOSIS — L40.50 PSORIATIC ARTHRITIS (H): ICD-10-CM

## 2023-08-18 RX ORDER — TRAZODONE HYDROCHLORIDE 100 MG/1
100 TABLET ORAL AT BEDTIME
Qty: 90 TABLET | Refills: 1 | Status: SHIPPED | OUTPATIENT
Start: 2023-08-18 | End: 2024-02-12

## 2023-08-18 RX ORDER — ATORVASTATIN CALCIUM 20 MG/1
20 TABLET, FILM COATED ORAL DAILY
Qty: 90 TABLET | Refills: 1 | Status: SHIPPED | OUTPATIENT
Start: 2023-08-18 | End: 2024-02-12

## 2023-08-18 RX ORDER — AMLODIPINE BESYLATE 5 MG/1
5 TABLET ORAL DAILY
Qty: 90 TABLET | Refills: 1 | Status: SHIPPED | OUTPATIENT
Start: 2023-08-18 | End: 2024-02-12

## 2023-08-18 RX ORDER — SERTRALINE HYDROCHLORIDE 100 MG/1
100 TABLET, FILM COATED ORAL DAILY
Qty: 90 TABLET | Refills: 1 | Status: SHIPPED | OUTPATIENT
Start: 2023-08-18 | End: 2024-02-12

## 2023-08-18 RX ORDER — LISINOPRIL AND HYDROCHLOROTHIAZIDE 20; 25 MG/1; MG/1
1 TABLET ORAL DAILY
Qty: 90 TABLET | Refills: 1 | Status: SHIPPED | OUTPATIENT
Start: 2023-08-18 | End: 2024-02-12

## 2023-08-18 RX ORDER — UPADACITINIB 15 MG/1
15 TABLET, EXTENDED RELEASE ORAL DAILY
Qty: 90 TABLET | Refills: 1 | Status: CANCELLED | OUTPATIENT
Start: 2023-08-18

## 2023-08-18 RX ORDER — PANTOPRAZOLE SODIUM 40 MG/1
40 TABLET, DELAYED RELEASE ORAL DAILY
Qty: 90 TABLET | Refills: 1 | Status: SHIPPED | OUTPATIENT
Start: 2023-08-18 | End: 2024-02-12

## 2023-08-18 NOTE — TELEPHONE ENCOUNTER
RX approved on 8/16 for Rinvoq called to pharmacy as they did not receive it.     Jessy Jimenez RN

## 2023-08-18 NOTE — TELEPHONE ENCOUNTER
Hello this looks like this was approved but never sent over can we please get this sent over please and thank you

## 2023-08-21 NOTE — TELEPHONE ENCOUNTER
Prior Authorization Approval    Medication: RINVOQ 15 MG PO TB24  Authorization Effective Date: 8/21/2023  Authorization Expiration Date: 2/14/2024  Approved Dose/Quantity: qd  Reference #: [id number]   Insurance Company: [employer] - Phone [phone] Fax [phone]  Expected CoPay:       CoPay Card Available:      Financial Assistance Needed: no  [facility] [place] [address] [address]  Pharmacy Notified: Yes  Patient Notified: Yes

## 2023-08-22 ENCOUNTER — APPOINTMENT (OUTPATIENT)
Dept: URBAN - METROPOLITAN AREA CLINIC 260 | Age: 58
Setting detail: DERMATOLOGY
End: 2023-08-23

## 2023-08-22 DIAGNOSIS — L40.0 PSORIASIS VULGARIS: ICD-10-CM

## 2023-08-22 DIAGNOSIS — B07.8 OTHER VIRAL WARTS: ICD-10-CM

## 2023-08-22 DIAGNOSIS — F33.0 MILD EPISODE OF RECURRENT MAJOR DEPRESSIVE DISORDER (H): ICD-10-CM

## 2023-08-22 DIAGNOSIS — K21.9 GASTROESOPHAGEAL REFLUX DISEASE WITHOUT ESOPHAGITIS: ICD-10-CM

## 2023-08-22 DIAGNOSIS — E78.5 HYPERLIPIDEMIA LDL GOAL <130: ICD-10-CM

## 2023-08-22 DIAGNOSIS — I10 ESSENTIAL HYPERTENSION: ICD-10-CM

## 2023-08-22 DIAGNOSIS — G47.00 INSOMNIA, UNSPECIFIED TYPE: ICD-10-CM

## 2023-08-22 PROCEDURE — OTHER PRESCRIPTION MEDICATION MANAGEMENT: OTHER

## 2023-08-22 PROCEDURE — 17110 DESTRUCT B9 LESION 1-14: CPT | Mod: 52

## 2023-08-22 PROCEDURE — OTHER COUNSELING: OTHER

## 2023-08-22 PROCEDURE — 99214 OFFICE O/P EST MOD 30 MIN: CPT | Mod: 25

## 2023-08-22 PROCEDURE — OTHER BENIGN DESTRUCTION: OTHER

## 2023-08-22 PROCEDURE — OTHER MIPS QUALITY: OTHER

## 2023-08-22 RX ORDER — PANTOPRAZOLE SODIUM 40 MG/1
40 TABLET, DELAYED RELEASE ORAL DAILY
Qty: 90 TABLET | Refills: 1 | OUTPATIENT
Start: 2023-08-22

## 2023-08-22 RX ORDER — LISINOPRIL AND HYDROCHLOROTHIAZIDE 20; 25 MG/1; MG/1
1 TABLET ORAL DAILY
Qty: 90 TABLET | Refills: 1 | OUTPATIENT
Start: 2023-08-22

## 2023-08-22 RX ORDER — AMLODIPINE BESYLATE 5 MG/1
5 TABLET ORAL DAILY
Qty: 90 TABLET | Refills: 1 | OUTPATIENT
Start: 2023-08-22

## 2023-08-22 RX ORDER — TRAZODONE HYDROCHLORIDE 100 MG/1
100 TABLET ORAL AT BEDTIME
Qty: 90 TABLET | Refills: 1 | OUTPATIENT
Start: 2023-08-22

## 2023-08-22 RX ORDER — SERTRALINE HYDROCHLORIDE 100 MG/1
100 TABLET, FILM COATED ORAL DAILY
Qty: 90 TABLET | Refills: 1 | OUTPATIENT
Start: 2023-08-22

## 2023-08-22 RX ORDER — ATORVASTATIN CALCIUM 20 MG/1
20 TABLET, FILM COATED ORAL DAILY
Qty: 90 TABLET | Refills: 1 | OUTPATIENT
Start: 2023-08-22

## 2023-08-22 ASSESSMENT — LOCATION DETAILED DESCRIPTION DERM
LOCATION DETAILED: RIGHT POSTERIOR EAR
LOCATION DETAILED: RIGHT DISTAL PALMAR SMALL FINGER
LOCATION DETAILED: LEFT RADIAL DORSAL HAND

## 2023-08-22 ASSESSMENT — LOCATION ZONE DERM
LOCATION ZONE: FINGER
LOCATION ZONE: EAR
LOCATION ZONE: HAND

## 2023-08-22 ASSESSMENT — LOCATION SIMPLE DESCRIPTION DERM
LOCATION SIMPLE: LEFT HAND
LOCATION SIMPLE: RIGHT SMALL FINGER
LOCATION SIMPLE: RIGHT EAR

## 2023-08-22 ASSESSMENT — PGA PSORIASIS: PGA PSORIASIS 2020: MILD

## 2023-08-22 ASSESSMENT — BSA PSORIASIS: % BODY COVERED IN PSORIASIS: 1

## 2023-08-22 NOTE — TELEPHONE ENCOUNTER
Pending Prescriptions:                       Disp   Refills    traZODone (DESYREL) 100 MG tablet         90 tab*1            Sig: Take 1 tablet (100 mg) by mouth At Bedtime    pantoprazole (PROTONIX) 40 MG EC tablet   90 tab*1            Sig: Take 1 tablet (40 mg) by mouth daily    lisinopril-hydrochlorothiazide (ZESTORETI*90 tab*1            Sig: Take 1 tablet by mouth daily    amLODIPine (NORVASC) 5 MG tablet          90 tab*1            Sig: Take 1 tablet (5 mg) by mouth daily    sertraline (ZOLOFT) 100 MG tablet         90 tab*1            Sig: Take 1 tablet (100 mg) by mouth daily    atorvastatin (LIPITOR) 20 MG tablet       90 tab*1            Sig: Take 1 tablet (20 mg) by mouth daily

## 2023-08-22 NOTE — PROCEDURE: PRESCRIPTION MEDICATION MANAGEMENT
Detail Level: Zone
Initiate Treatment: taclonex topical ointment PRN
Render In Strict Bullet Format?: Yes

## 2023-08-22 NOTE — PROCEDURE: COUNSELING
Patient Specific Counseling (Will Not Stick From Patient To Patient): - Can start wart stick QHS if desired
Detail Level: Detailed

## 2023-08-23 NOTE — TELEPHONE ENCOUNTER
Reason for call:  Other     Patient called regarding (reason for call): prescription    Additional comments: Pharmacy says they do not have any of these refills patient has been out of medications for 3 days. Please advise and resubmit and send to [facility] pharmacy please and thank you.    Phone number to reach patient:  Cell number on file:    Telephone Information:   Mobile [phone]       Best Time:  any    Can we leave a detailed message on this number?  YES

## 2023-09-05 ENCOUNTER — APPOINTMENT (OUTPATIENT)
Dept: URBAN - METROPOLITAN AREA CLINIC 260 | Age: 58
Setting detail: DERMATOLOGY
End: 2023-09-05

## 2023-09-05 VITALS — HEIGHT: 60 IN | WEIGHT: 185 LBS

## 2023-09-05 DIAGNOSIS — B07.8 OTHER VIRAL WARTS: ICD-10-CM

## 2023-09-05 PROCEDURE — OTHER BENIGN DESTRUCTION: OTHER

## 2023-09-05 PROCEDURE — OTHER MIPS QUALITY: OTHER

## 2023-09-05 PROCEDURE — OTHER COUNSELING: OTHER

## 2023-09-05 PROCEDURE — 17110 DESTRUCT B9 LESION 1-14: CPT | Mod: 52

## 2023-09-05 ASSESSMENT — LOCATION ZONE DERM: LOCATION ZONE: FINGER

## 2023-09-05 ASSESSMENT — LOCATION SIMPLE DESCRIPTION DERM: LOCATION SIMPLE: RIGHT SMALL FINGER

## 2023-09-05 ASSESSMENT — LOCATION DETAILED DESCRIPTION DERM: LOCATION DETAILED: RIGHT DISTAL PALMAR SMALL FINGER

## 2023-09-18 ENCOUNTER — APPOINTMENT (OUTPATIENT)
Dept: URBAN - METROPOLITAN AREA CLINIC 260 | Age: 58
Setting detail: DERMATOLOGY
End: 2023-09-18

## 2023-09-18 DIAGNOSIS — B07.8 OTHER VIRAL WARTS: ICD-10-CM

## 2023-09-18 PROCEDURE — OTHER PRESCRIPTION: OTHER

## 2023-09-18 PROCEDURE — OTHER MIPS QUALITY: OTHER

## 2023-09-18 PROCEDURE — 17110 DESTRUCT B9 LESION 1-14: CPT | Mod: 52

## 2023-09-18 PROCEDURE — OTHER BENIGN DESTRUCTION: OTHER

## 2023-09-18 PROCEDURE — OTHER COUNSELING: OTHER

## 2023-09-18 PROCEDURE — OTHER PRESCRIPTION MEDICATION MANAGEMENT: OTHER

## 2023-09-18 RX ORDER — SALICYLIC ACID 17 %
KIT TOPICAL
Qty: 5 | Refills: 1 | Status: ERX | COMMUNITY
Start: 2023-09-18

## 2023-09-18 ASSESSMENT — LOCATION DETAILED DESCRIPTION DERM: LOCATION DETAILED: RIGHT DISTAL PALMAR SMALL FINGER

## 2023-09-18 ASSESSMENT — LOCATION ZONE DERM: LOCATION ZONE: FINGER

## 2023-09-18 ASSESSMENT — LOCATION SIMPLE DESCRIPTION DERM: LOCATION SIMPLE: RIGHT SMALL FINGER

## 2023-09-18 NOTE — PROCEDURE: BENIGN DESTRUCTION
Medical Necessity Clause: This procedure was medically necessary because the lesions that were treated were:
Add 52 Modifier (Optional): yes
Anesthesia Volume In Cc: 0
Include Z78.9 (Other Specified Conditions Influencing Health Status) As An Associated Diagnosis?: No
Post-Care Instructions: I reviewed with the patient in detail post-care instructions. Patient is to wear sunprotection, and avoid picking at any of the treated lesions. Pt may apply Vaseline to crusted or scabbing areas.
Number Of Freeze-Thaw Cycles: 2 freeze-thaw cycles
Medical Necessity Information: It is in your best interest to select a reason for this procedure from the list below. All of these items fulfill various CMS LCD requirements except the new and changing color options.
Consent: The patient's consent was obtained including but not limited to risks of crusting, scabbing, blistering, scarring, darker or lighter pigmentary change, recurrence, incomplete removal and infection.
Duration Of Freeze Thaw-Cycle (Seconds): 10-15
Detail Level: Detailed

## 2023-09-18 NOTE — PROCEDURE: PRESCRIPTION MEDICATION MANAGEMENT
Plan: Discussed wart peel prescription and patient would like to do it. Follow up in 3 weeks for recheck
Render In Strict Bullet Format?: Yes
Initiate Treatment: Wart peel prescription as directed
Detail Level: Zone

## 2023-10-04 ENCOUNTER — LAB (OUTPATIENT)
Dept: LAB | Facility: CLINIC | Age: 58
End: 2023-10-04
Payer: COMMERCIAL

## 2023-10-04 DIAGNOSIS — Z79.60 LONG-TERM USE OF IMMUNOSUPPRESSANT MEDICATION: ICD-10-CM

## 2023-10-04 DIAGNOSIS — M19.90 INFLAMMATORY ARTHRITIS: ICD-10-CM

## 2023-10-04 LAB
ALBUMIN SERPL BCG-MCNC: 4.6 G/DL (ref 3.5–5.2)
ALT SERPL W P-5'-P-CCNC: 39 U/L (ref 0–70)
AST SERPL W P-5'-P-CCNC: 32 U/L (ref 0–45)
CREAT SERPL-MCNC: 0.99 MG/DL (ref 0.67–1.17)
EGFRCR SERPLBLD CKD-EPI 2021: 89 ML/MIN/1.73M2
ERYTHROCYTE [DISTWIDTH] IN BLOOD BY AUTOMATED COUNT: 12.9 % (ref 10–15)
HCT VFR BLD AUTO: 40.3 % (ref 40–53)
HGB BLD-MCNC: 13.7 G/DL (ref 13.3–17.7)
MCH RBC QN AUTO: 29 PG (ref 26.5–33)
MCHC RBC AUTO-ENTMCNC: 34 G/DL (ref 31.5–36.5)
MCV RBC AUTO: 85 FL (ref 78–100)
PLATELET # BLD AUTO: 232 10E3/UL (ref 150–450)
RBC # BLD AUTO: 4.73 10E6/UL (ref 4.4–5.9)
WBC # BLD AUTO: 4.7 10E3/UL (ref 4–11)

## 2023-10-04 PROCEDURE — 82040 ASSAY OF SERUM ALBUMIN: CPT

## 2023-10-04 PROCEDURE — 80061 LIPID PANEL: CPT

## 2023-10-04 PROCEDURE — 82565 ASSAY OF CREATININE: CPT

## 2023-10-04 PROCEDURE — 36415 COLL VENOUS BLD VENIPUNCTURE: CPT

## 2023-10-04 PROCEDURE — 85027 COMPLETE CBC AUTOMATED: CPT

## 2023-10-04 PROCEDURE — 84450 TRANSFERASE (AST) (SGOT): CPT

## 2023-10-04 PROCEDURE — 84460 ALANINE AMINO (ALT) (SGPT): CPT

## 2023-10-05 NOTE — PROGRESS NOTES
Rheumatology Clinic Visit  [facility]  Rashaun Smith M.D.     Giovanni Mcgill MRN# [id number]   YOB: 1965 Age: 58 year old   Date of Visit: 10/06/2023  Primary care provider: No primary care provider on file.          Assessment and Plan:     # Psoriatic arthritis: Patient describes significantly improved control of pain swelling, stiffness, and decreased range of motion in multiple finger joints, right greater than left hand.  No persistent morning stiffness.  Physical exam shows resolution of former synovial thickening at right second MCP.  Chronic changes with flexion contracture at the right fifth DIP continues. No psoriasis notable.    Data: Lab work on October 4, 2023 showed creatinine, transaminases, CBC all normal.   In 2020, hepatitis B, hepatitis C, and rheumatoid factor, cyclic citrullinated peptide antibodies were negative.    Imaging: Reports of x-rays of both hands done on May 4, 2020 revealed mild bilateral osteoarthritis of the hands and thumb bases and a well-corticated triquetrium fracture in the left wrist.    Discussion: Inflammatory joint symptoms are much better controlled, and skin symptoms are absent, now greater than 1 year after starting treatment with upadacitinib.  I think that psoriatic arthritis is well controlled, and that monotherapy with upadacitinib should continue.  Alternative treatments to consider would include addition of methotrexate to upadacitinib, or to consider abatacept for alternative IL-17 antagonist (Taltz, patient has already tried and discarded secukinumab).    Plan:  1.  Check kidney function, liver function, blood counts, and lipid panel every 4-6 months.  2.  upadacitinib 15 mg once daily for psoriatic arthritis.   3.  For residual occasional joint pain, use ibuprofen 600 to 800 mg up to twice daily as needed.  4. Followup with Orthopedics for ongoing knee pain    Orders Placed This Encounter   Procedures    Lipid panel reflex to direct LDL  "Fasting    Comprehensive metabolic panel    CBC with platelets differential       RTC 9 months    Rashaun Smith MD  Staff Rheumatologist, [facility]             History of Present Illness:   Giovanni Mcgill presents for evaluation of psoriatic arthritis. Last seen in .  At that time, resumption of Rinvoq for psoriatic arthritis which was moderately active was recommended.    Bkgd: Upadacitinib was started in summer 2022 with good control of both skin and joint symptoms of psoriatic arthritis.    Notes from previous rheumatologist:    \"...Due to persistent joint swelling and pain over his hands despite chronic Humira use, treatment was switched to Otezla in Feb 2020, after discussion with Dermatology, given prior good response (was discontinued in the past due to change in insurance coverage). However, patient did not have as good of a response to Otezla this time around, and it was switched to Cosentyx (May 2020). Due to persistent MSK symptoms, MTX was added in June 2020. However, due to persistent disease, Cosentyx was switched to Enbrel (Sept 2020). Patient did better on combination of subQ MTX + Enbrel. SSZ was added in Aug 2021 for residual MSK symptoms, but was discontinued soon after due to significant GI side effects. Due to residual MSK symptoms on Enbrel previously, discussed switching therapy to either Stelara, Tremfya, or Taltz -- patient opted for Stelara (Nov 2021). However, patient had persistent MSK symptoms on chronic MTX + Stelara. Stelara was thus switched to Tremfya (Apr 2022)...  Tremfya was discontinued in summer 2022 due to lack of efficacy.  Rinvoq was started then and continued through present.      Interval October 6, 2023  He notes mild aching stiffness, more in the R than L hands, persistent through time.  Overall, though, swelling, pain, and painful range of motion has improved significantly since last visit in December 2022.  No separable early morning stiffness. ADLS are not " "significantly affected. He can lift 50 lb or work for a day.  No progression of psoriasis, he uses cream on gluteal crease lesion intermittently. Extremity patches have resolved.    +intermittent L sided neck pain. Worse with rotation to the left. Pain radiates down the L neck into the shoulder. Seeing chiropractor.    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 There was tenderness and stiffness. He was shuffling, walking slow. He awakened and pain had resolved. No fever, no swelling.    Using rinvoq 15 mg daily. No AE.  Started lipitor 6 months ago.    Initial history   Patient was last seen by Rheumatologist Dr. Cardoso in [place] in 8-2022. Except from Dr. Cardoso's note containing timeline of anti-rheumatic medication use for psoriatic arthritis:    \"...Due to persistent joint swelling and pain over his hands despite chronic Humira use, treatment was switched to Otezla in Feb 2020, after discussion with Dermatology, given prior good response (was discontinued in the past due to change in insurance coverage). However, patient did not have as good of a response to Otezla this time around, and it was switched to Cosentyx (May 2020). Due to persistent MSK symptoms, MTX was added in June 2020. However, due to persistent disease, Cosentyx was switched to Enbrel (Sept 2020). Patient did better on combination of subQ MTX + Enbrel. SSZ was added in Aug 2021 for residual MSK symptoms, but was discontinued soon after due to significant GI side effects. Due to residual MSK symptoms on Enbrel previously, discussed switching therapy to either Stelara, Tremfya, or Taltz -- patient opted for Stelara (Nov 2021). However, patient had persistent MSK symptoms on chronic MTX + Stelara. Stelara was thus switched to Tremfya (Apr 2022)...\"      In 8-12 Dr. Cardoso noted persistent swelling over right wrist and right 2nd MCP on exam. Patient has had 3 doses of Tremfya so far (including " "loading dose), and he mentions having persistent polyarthritis on this and full dose subQ MTX. Denies any missed doses of DMARD therapy. Impression was of active psoriatic arthritis, recently started on Tremfya.  Plan was:    \"- Will give Tremfya a bit more time to adequately assess efficacy, or lack thereof -- continue 100mg subQ every 8 weeks as maintenance dose  - Offered prednisone vs IM corticosteroid for persistent MSK symptoms, but patient opted to hold off for now  - Continue subQ MTX 25mg weekly  - Continue folic acid 1mg daily  - Check repeat CBC/D, CMP, ESR, and CRP in 8 weeks for MTX toxicity monitoring\"    Patient saw rheumatology 1 further time in September 2022.  At that visit, lack of response to Tremfya was noted.  Recommendation was to discontinue Tremfya and to start Rinvoq daily.    Initial history December 14, 2022    Patient reports onset of swelling, redness, and pain in multiple finger and knuckle joints at around age 31.  At approximately the same time, he experienced onset of a scaly \"patch\" on the left knee, and occasional scaly patches over the belly, gluteal cleft, scalp, and chest.  He did not have psoriasis as a teenager or child.  The scaly patches were manageable with use of as needed Elidel cream.  Joint pain progressed and interfered with activities of daily living.  He reports receiving significant relief with use of tapering prednisone, but when prednisone was removed, he was started on methotrexate.  Methotrexate gave inadequate relief after 3 to 4 months.  He was then treated with etanercept with good response.  He believes he started etanercept shortly after it became FDA approved in the early 2000's, and used it continuously until approximately 2012.  At that time, etanercept was discontinued and Humira was substituted due to a change of insurance.  He had continued relief of inflammatory joint symptoms as well as reduced psoriasis with use of Humira.  In the range of " [id number], he substituted Otezla for Humira; again good relief of both joint and skin symptoms were noted, but insurance dictated that Humira be restarted as of approximately 2015.  He took Humira through February 2020, when Otezla was again tried.  At that point, patient endorses the antirheumatic medication history that Dr. Taylor listed in his notes of September and July 2022.    In the last 5 months, he reports that Tremfya was discontinued after approximately 3 to 4 months trial.  He noted no change in daily joint pain.  As of early September, Tremfya was discontinued, and Rinvoq was substituted.  He reports that within 3 to 4 weeks, he noticed swelling and pain in his right second knuckle decreasing.  He was only able to continue Rinvoq for approximately 1 month before he changed residence and moved from [place] to [place].  He ran out of Rinvoq (he reports that he was given samples by previous treating rheumatologist) in mid October.  He has not used Rinvoq or methotrexate since mid October 2022.    Today he has pain in base of both thumbs, notes shape change in fingers of both hands.  Pain is gradually worsening, and difficulty with finger motion as increased in the last 6 weeks.  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  Holding and strumming the guitar has become more painful and difficult.  Lifting, gripping, opening jars are difficult because of decreased and painful painful pinch, especially right hand. He is R handed, symptoms worse consistently in that hand.  Morning stiffness in the hands is approximately 30 to 60 minutes.  He denies eye pain, GI/ symptoms, low back pain, fevers chills or sweats, or worsening of psoriasis in the last several months.  He has not used oral prednisone for over a year.  He occasionally uses ibuprofen Tylenol or Aleve once or twice monthly.         Review of Systems:   Except for HPI, negative  Constitutional: negative  Skin: negative  Eyes:  negative  Ears/Nose/Throat: negative  Respiratory: No shortness of breath, dyspnea on exertion, cough, or hemoptysis  Cardiovascular: negative  Gastrointestinal: negative  Genitourinary: negative  Musculoskeletal: negative  Neurologic: negative  Psychiatric: negative  Hematologic/Lymphatic/Immunologic: negative  Endocrine: negative         Active Problem List:     Patient Active Problem List    Diagnosis Date Noted    Elevated prostate specific antigen (PSA) 02/27/2023     Priority: Medium    Psoriasis 12/08/2022     Priority: Medium     Has some patchs on knees and things. Uses picrolomis and eledex. No longer seeing dermatology        Psoriatic arthritis (H) 12/08/2022     Priority: Medium     Formatting of this note might be different from the original.  Sees rheumatology and derm  Now Rinvoq- has been on several different biologics in the past      Microcytic anemia 12/13/2021     Priority: Medium     Formatting of this note might be different from the original.  Iron values are better and WNL.  Iron sulfate caused GI issues. Suspect has a component of AOCD. Will monitor.   He will start a multivitamin with iron.      Primary osteoarthritis of right knee 09/09/2021     Priority: Medium    Primary osteoarthritis of both knees 08/17/2021     Priority: Medium     Formatting of this note might be different from the original.  Per rheumatology.      Hyperlipidemia LDL goal <130 01/09/2019     Priority: Medium     Formatting of this note might be different from the original.  Not on meds      Gastroesophageal reflux disease without esophagitis 12/12/2016     Priority: Medium     Formatting of this note might be different from the original.  Well controled no dysphagia. Denies dark stools, BPR, and hemopytsis.  On protonix    Last Assessment & Plan:   Formatting of this note might be different from the original.  Stable continue the same.      Episodic mood disorder (H24) 05/14/2015     Priority: Medium     Formatting  "of this note might be different from the original.  stable on zoloft 100 mg daily  Denies HSI, AVH, anhedonia, despair    PHQ 2/24/2023   PHQ-9 Total Score 0   Q9: Thoughts of better off dead/self-harm past 2 weeks Not at all           Essential hypertension 05/14/2015     Priority: Medium     Formatting of this note is different from the original.  BP Readings from Last 3 Encounters:   12/13/21 (!) 180/110   11/09/21 (!) 181/105   08/03/21 (!) 164/92     Taking meds.  Not well controlled. Denies chest pain and SOB, swelling, dizziness or orthostatics.  Increase HCTZ and add amlodipine.         Last Assessment & Plan:   Formatting of this note might be different from the original.  Will increase lisinopril to 20 mg. Leave HCTZ at 12.5 mg      Insomnia 05/14/2015     Priority: Medium     Formatting of this note might be different from the original.  Stable on trazodone  Continue same.       Last Assessment & Plan:   Formatting of this note might be different from the original.  Stable continue the same.              Past Medical History:   No past medical history on file.  Past Surgical History:   Procedure Laterality Date    REPLACEMENT TOTAL KNEE Bilateral    # Scoliosis since adolescence:  # Hiatal hernia: on protonix continuously for 20 year  #S/p TKR in 9 and 10 of 2021: reports being bow-legged. Much improved now.    # Psoriatic arthritis: Summary from [facility] 8-22\"...Due to persistent joint swelling and pain over his hands despite chronic Humira use, treatment was switched to Otezla in Feb 2020, after discussion with Dermatology, given prior good response (was discontinued in the past due to change in insurance coverage). However, patient did not have as good of a response to Otezla this time around, and it was switched to Cosentyx (May 2020). Due to persistent MSK symptoms, MTX was added in June 2020. However, due to persistent disease, Cosentyx was switched to Enbrel (Sept 2020). Patient did " "better on combination of subQ MTX + Enbrel. SSZ was added in Aug 2021 for residual MSK symptoms, but was discontinued soon after due to significant GI side effects. Due to residual MSK symptoms on Enbrel previously, discussed switching therapy to either Stelara, Tremfya, or Taltz -- patient opted for Stelara (Nov 2021). However, patient had persistent MSK symptoms on chronic MTX + Stelara. Stelara was thus switched to Tremfya (Apr 2022)...\"         Social History:     Social History     Socioeconomic History    Marital status:      Spouse name: Not on file    Number of children: Not on file    Years of education: Not on file    Highest education level: Not on file   Occupational History    Not on file   Tobacco Use    Smoking status: Never    Smokeless tobacco: Never   Vaping Use    Vaping Use: Never used   Substance and Sexual Activity    Alcohol use: Yes     Alcohol/week: 3.0 standard drinks of alcohol     Types: 1 Glasses of wine, 1 Cans of beer, 1 Shots of liquor per week    Drug use: Never    Sexual activity: Yes     Partners: Female     Birth control/protection: Post-menopausal, Female Surgical   Other Topics Concern    Not on file   Social History Narrative    Not on file     Social Determinants of Health     Financial Resource Strain: Not on file   Food Insecurity: Not on file   Transportation Needs: Not on file   Physical Activity: Not on file   Stress: Not on file   Social Connections: Not on file   Interpersonal Safety: Not on file   Housing Stability: Not on file     Has worked as a //. 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  Has 2 biological children, healthy  Never smoker  Drinks 1 EtOH once weekly.       Family History:     Family History   Problem Relation Age of Onset    Coronary Artery Disease Mother     Bladder Cancer Father      MGF had \"skin disease\"  Mother has OA with THR an TKR         Allergies:   No Known Allergies         Medications:     Current Outpatient " Medications   Medication Sig Dispense Refill    amLODIPine (NORVASC) 5 MG tablet Take 1 tablet (5 mg) by mouth daily 90 tablet 1    aspirin (ASA) 81 MG EC tablet       atorvastatin (LIPITOR) 20 MG tablet Take 1 tablet (20 mg) by mouth daily 90 tablet 1    Calcipotriene-Betameth Diprop 0.005-0.064 % SUSP Apply twice daily to your affected skin for up to 2 weeks. Strength: 0.005-0.064 % 120 g 3    hydrocortisone 2.5 % cream Apply topically 2 times daily 30 g 3    lisinopril-hydrochlorothiazide (ZESTORETIC) 20-25 MG tablet Take 1 tablet by mouth daily 90 tablet 1    multivitamin w/minerals (THERA-VIT-M) tablet Take 1 tablet by mouth daily      omega 3 1000 MG CAPS Take 2 g by mouth daily      pantoprazole (PROTONIX) 40 MG EC tablet Take 1 tablet (40 mg) by mouth daily 90 tablet 1    pimecrolimus (ELIDEL) 1 % external cream Apply topically 2 times daily 100 g 3    sertraline (ZOLOFT) 100 MG tablet Take 1 tablet (100 mg) by mouth daily 90 tablet 1    sildenafil (VIAGRA) 50 MG tablet Take 1 tablet (50 mg) by mouth daily as needed (erectile dyfunction) 20 tablet 0    tamsulosin (FLOMAX) 0.4 MG capsule Take 1 capsule (0.4 mg) by mouth daily 30 capsule 1    traZODone (DESYREL) 100 MG tablet Take 1 tablet (100 mg) by mouth At Bedtime 90 tablet 1    upadacitinib ER (RINVOQ) 15 MG tablet Take 1 tablet (15 mg) by mouth daily Hold for signs of infection, and seek medical attention. 90 tablet 2    levofloxacin (LEVAQUIN) 500 MG tablet Take 1 tablet (500 mg) by mouth daily (Patient not taking: Reported on 10/6/2023) 10 tablet 0            Physical Exam:   Blood pressure 127/84, pulse 63, weight 87.5 kg (193 lb), SpO2 96 %.  Wt Readings from Last 6 Encounters:   10/06/23 87.5 kg (193 lb)   02/24/23 86.3 kg (190 lb 5 oz)   12/14/22 84.6 kg (186 lb 6.4 oz)   12/08/22 84.6 kg (186 lb 6.4 oz)     Constitutional: well-developed, appearing stated age; cooperative  Eyes: nl EOM, PERRLA, vision, conjunctiva, sclera  ENT: nl external ears,  "nose, hearing, lips, teeth, gums, throat  No mucous membrane lesions, normal saliva pool  Neck: no mass or thyroid enlargement  Resp: breathing unlabored  Lymph: no cervical, supraclavicular, inguinal or epitrochlear nodes  MS:  angulation and bony enlargement notable at multiple DIPs in both hands, prominently right second DIP with early \"swan-neck\" change.   strength is slightly reduced right greater than left.  There is incomplete flexion of second fourth and fifth DIPs with fist formation in the right greater than left hands.  Wrist range of motion intact, as is elbow shoulder hip ankle and midfoot.  No tenderness at MTPs and no visible dactylitis or enthesopathy on lower extremity exam.  Low back shows no tenderness at SI joint; lumbar spine range of motion is intact.  There is rightward convex scoliosis in the thoracic spine.  Knees show bilateral cool modest effusions.  Well-healed anterior surgical scars.  Skin: no psoriasis visilbe at knees, low back  Neuro: nl cranial nerves, strength  Psych: nl judgement, orientation, memory, affect.         Data:     @      Latest Ref Rng & Units 2/24/2023     8:44 AM 6/28/2023     1:29 PM 10/4/2023     8:49 AM   RHEUM RESULTS   Albumin 3.5 - 5.2 g/dL 4.6  4.6  4.6    ALT 0 - 70 U/L 40  38  39    AST 0 - 45 U/L 34  30  32    Creatinine 0.67 - 1.17 mg/dL 0.93  0.99  0.99    GFR Estimate >60 mL/min/1.73m2 >90  89  89    Hematocrit 40.0 - 53.0 % 40.2  38.6  40.3    Hemoglobin 13.3 - 17.7 g/dL 13.6  13.3  13.7    WBC 4.0 - 11.0 10e3/uL 4.8  5.3  4.7    RBC Count 4.40 - 5.90 10e6/uL 5.01  4.61  4.73    RDW 10.0 - 15.0 % 14.6  13.5  12.9    MCHC 31.5 - 36.5 g/dL 33.8  34.5  34.0    MCV 78 - 100 fL 80  84  85    Platelet Count 150 - 450 10e3/uL 212  208  232         ,  ,  ,  ,  ,  ,  ,  ,  ,  ,  ,  ,  ,  ,  ,  ,  ,  ,  ,  ,  ,  ,  ,  ,  ,  ,  ,  ,  ,  ,  ,  ,  ,  ,  ,  ,  ,  ,  ,  ,  ,  ,  ,  ,     "

## 2023-10-06 ENCOUNTER — OFFICE VISIT (OUTPATIENT)
Dept: RHEUMATOLOGY | Facility: CLINIC | Age: 58
End: 2023-10-06
Attending: NURSE PRACTITIONER
Payer: COMMERCIAL

## 2023-10-06 VITALS
OXYGEN SATURATION: 96 % | BODY MASS INDEX: 29.35 KG/M2 | HEART RATE: 63 BPM | DIASTOLIC BLOOD PRESSURE: 84 MMHG | WEIGHT: 193 LBS | SYSTOLIC BLOOD PRESSURE: 127 MMHG

## 2023-10-06 DIAGNOSIS — L40.50 PSORIATIC ARTHRITIS (H): ICD-10-CM

## 2023-10-06 DIAGNOSIS — Z79.60 LONG-TERM USE OF IMMUNOSUPPRESSANT MEDICATION: Primary | ICD-10-CM

## 2023-10-06 LAB
CHOLEST SERPL-MCNC: 149 MG/DL
HDLC SERPL-MCNC: 47 MG/DL
LDLC SERPL CALC-MCNC: 81 MG/DL
NONHDLC SERPL-MCNC: 102 MG/DL
TRIGL SERPL-MCNC: 105 MG/DL

## 2023-10-06 PROCEDURE — 99213 OFFICE O/P EST LOW 20 MIN: CPT | Performed by: INTERNAL MEDICINE

## 2023-10-06 PROCEDURE — 99214 OFFICE O/P EST MOD 30 MIN: CPT | Performed by: INTERNAL MEDICINE

## 2023-10-06 RX ORDER — UPADACITINIB 15 MG/1
15 TABLET, EXTENDED RELEASE ORAL DAILY
Qty: 90 TABLET | Refills: 2 | Status: SHIPPED | OUTPATIENT
Start: 2023-10-06 | End: 2024-07-12

## 2023-10-06 ASSESSMENT — PAIN SCALES - GENERAL: PAINLEVEL: NO PAIN (0)

## 2023-10-06 NOTE — LETTER
10/6/2023       RE: Giovanni Mcgill  [address]  [address]  [facility] [address]     Dear Colleague,    Thank you for referring your patient, Giovanni Mcgill, to the [facility] [place] at [facility]. Please see a copy of my visit note below.    Rheumatology Clinic Visit  [facility]  Rashaun Smith M.D.     Giovanni Mcgill MRN# [id number]   YOB: 1965 Age: 58 year old   Date of Visit: 10/06/2023  Primary care provider: No primary care provider on file.          Assessment and Plan:     # Psoriatic arthritis: Patient describes significantly improved control of pain swelling, stiffness, and decreased range of motion in multiple finger joints, right greater than left hand.  No persistent morning stiffness.  Physical exam shows resolution of former synovial thickening at right second MCP.  Chronic changes with flexion contracture at the right fifth DIP continues. No psoriasis notable.    Data: Lab work on October 4, 2023 showed creatinine, transaminases, CBC all normal.   In 2020, hepatitis B, hepatitis C, and rheumatoid factor, cyclic citrullinated peptide antibodies were negative.    Imaging: Reports of x-rays of both hands done on May 4, 2020 revealed mild bilateral osteoarthritis of the hands and thumb bases and a well-corticated triquetrium fracture in the left wrist.    Discussion: Inflammatory joint symptoms are much better controlled, and skin symptoms are absent, now greater than 1 year after starting treatment with upadacitinib.  I think that psoriatic arthritis is well controlled, and that monotherapy with upadacitinib should continue.  Alternative treatments to consider would include addition of methotrexate to upadacitinib, or to consider abatacept for alternative IL-17 antagonist (Taltz, patient has already tried and discarded secukinumab).    Plan:  1.  Check kidney function, liver function, blood counts, and  "lipid panel every 4-6 months.  2.  upadacitinib 15 mg once daily for psoriatic arthritis.   3.  For residual occasional joint pain, use ibuprofen 600 to 800 mg up to twice daily as needed.  4. Followup with Orthopedics for ongoing knee pain    Orders Placed This Encounter   Procedures    Lipid panel reflex to direct LDL Fasting    Comprehensive metabolic panel    CBC with platelets differential       RTC 9 months    Rashaun Smith MD  Staff Rheumatologist, [facility]             History of Present Illness:   Giovanni Mcgill presents for evaluation of psoriatic arthritis. Last seen in .  At that time, resumption of Rinvoq for psoriatic arthritis which was moderately active was recommended.    Bkgd: Upadacitinib was started in summer 2022 with good control of both skin and joint symptoms of psoriatic arthritis.    Notes from previous rheumatologist:    \"...Due to persistent joint swelling and pain over his hands despite chronic Humira use, treatment was switched to Otezla in Feb 2020, after discussion with Dermatology, given prior good response (was discontinued in the past due to change in insurance coverage). However, patient did not have as good of a response to Otezla this time around, and it was switched to Cosentyx (May 2020). Due to persistent MSK symptoms, MTX was added in June 2020. However, due to persistent disease, Cosentyx was switched to Enbrel (Sept 2020). Patient did better on combination of subQ MTX + Enbrel. SSZ was added in Aug 2021 for residual MSK symptoms, but was discontinued soon after due to significant GI side effects. Due to residual MSK symptoms on Enbrel previously, discussed switching therapy to either Stelara, Tremfya, or Taltz -- patient opted for Stelara (Nov 2021). However, patient had persistent MSK symptoms on chronic MTX + Stelara. Stelara was thus switched to Tremfya (Apr 2022)...  Tremfya was discontinued in summer 2022 due to lack of efficacy.  Rinvoq was started then and " "continued through present.      Interval October 6, 2023  He notes mild aching stiffness, more in the R than L hands, persistent through time.  Overall, though, swelling, pain, and painful range of motion has improved significantly since last visit in December 2022.  No separable early morning stiffness. ADLS are not significantly affected. He can lift 50 lb or work for a day.  No progression of psoriasis, he uses cream on gluteal crease lesion intermittently. Extremity patches have resolved.    +intermittent L sided neck pain. Worse with rotation to the left. Pain radiates down the L neck into the shoulder. Seeing chiropractor.    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 There was tenderness and stiffness. He was shuffling, walking slow. He awakened and pain had resolved. No fever, no swelling.    Using rinvoq 15 mg daily. No AE.  Started lipitor 6 months ago.    Initial history   Patient was last seen by Rheumatologist Dr. Cardoso in [place] in 8-2022. Except from Dr. Cardoso's note containing timeline of anti-rheumatic medication use for psoriatic arthritis:    \"...Due to persistent joint swelling and pain over his hands despite chronic Humira use, treatment was switched to Otezla in Feb 2020, after discussion with Dermatology, given prior good response (was discontinued in the past due to change in insurance coverage). However, patient did not have as good of a response to Otezla this time around, and it was switched to Cosentyx (May 2020). Due to persistent MSK symptoms, MTX was added in June 2020. However, due to persistent disease, Cosentyx was switched to Enbrel (Sept 2020). Patient did better on combination of subQ MTX + Enbrel. SSZ was added in Aug 2021 for residual MSK symptoms, but was discontinued soon after due to significant GI side effects. Due to residual MSK symptoms on Enbrel previously, discussed switching therapy to either Stelara, Tremfya, or " "Taltz -- patient opted for Stelara (Nov 2021). However, patient had persistent MSK symptoms on chronic MTX + Stelara. Stelara was thus switched to Tremfya (Apr 2022)...\"      In 8-12 Dr. Cardoso noted persistent swelling over right wrist and right 2nd MCP on exam. Patient has had 3 doses of Tremfya so far (including loading dose), and he mentions having persistent polyarthritis on this and full dose subQ MTX. Denies any missed doses of DMARD therapy. Impression was of active psoriatic arthritis, recently started on Tremfya.  Plan was:    \"- Will give Tremfya a bit more time to adequately assess efficacy, or lack thereof -- continue 100mg subQ every 8 weeks as maintenance dose  - Offered prednisone vs IM corticosteroid for persistent MSK symptoms, but patient opted to hold off for now  - Continue subQ MTX 25mg weekly  - Continue folic acid 1mg daily  - Check repeat CBC/D, CMP, ESR, and CRP in 8 weeks for MTX toxicity monitoring\"    Patient saw rheumatology 1 further time in September 2022.  At that visit, lack of response to Tremfya was noted.  Recommendation was to discontinue Tremfya and to start Rinvoq daily.    Initial history December 14, 2022    Patient reports onset of swelling, redness, and pain in multiple finger and knuckle joints at around age 31.  At approximately the same time, he experienced onset of a scaly \"patch\" on the left knee, and occasional scaly patches over the belly, gluteal cleft, scalp, and chest.  He did not have psoriasis as a teenager or child.  The scaly patches were manageable with use of as needed Elidel cream.  Joint pain progressed and interfered with activities of daily living.  He reports receiving significant relief with use of tapering prednisone, but when prednisone was removed, he was started on methotrexate.  Methotrexate gave inadequate relief after 3 to 4 months.  He was then treated with etanercept with good response.  He believes he started etanercept shortly after it " became FDA approved in the early 2000's, and used it continuously until approximately 2012.  At that time, etanercept was discontinued and Humira was substituted due to a change of insurance.  He had continued relief of inflammatory joint symptoms as well as reduced psoriasis with use of Humira.  In the range of 9133-0127, he substituted Otezla for Humira; again good relief of both joint and skin symptoms were noted, but insurance dictated that Humira be restarted as of approximately 2015.  He took Humira through February 2020, when Otezla was again tried.  At that point, patient endorses the antirheumatic medication history that  Taking listed in his notes of September and July 2022.    In the last 5 months, he reports that Tremfya was discontinued after approximately 3 to 4 months trial.  He noted no change in daily joint pain.  As of early September, Tremfya was discontinued, and Rinvoq was substituted.  He reports that within 3 to 4 weeks, he noticed swelling and pain in his right second knuckle decreasing.  He was only able to continue Rinvoq for approximately 1 month before he changed residence and moved from [place] to [place].  He ran out of Rinvoq (he reports that he was given samples by previous treating rheumatologist) in mid October.  He has not used Rinvoq or methotrexate since mid October 2022.    Today he has pain in base of both thumbs, notes shape change in fingers of both hands.  Pain is gradually worsening, and difficulty with finger motion as increased in the last 6 weeks.  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  Holding and strumming the guitar has become more painful and difficult.  Lifting, gripping, opening jars are difficult because of decreased and painful painful pinch, especially right hand. He is R handed, symptoms worse consistently in that hand.  Morning stiffness in the hands is approximately 30 to 60 minutes.  He denies eye pain, GI/ symptoms, low back pain, fevers  chills or sweats, or worsening of psoriasis in the last several months.  He has not used oral prednisone for over a year.  He occasionally uses ibuprofen Tylenol or Aleve once or twice monthly.         Review of Systems:   Except for HPI, negative  Constitutional: negative  Skin: negative  Eyes: negative  Ears/Nose/Throat: negative  Respiratory: No shortness of breath, dyspnea on exertion, cough, or hemoptysis  Cardiovascular: negative  Gastrointestinal: negative  Genitourinary: negative  Musculoskeletal: negative  Neurologic: negative  Psychiatric: negative  Hematologic/Lymphatic/Immunologic: negative  Endocrine: negative         Active Problem List:     Patient Active Problem List    Diagnosis Date Noted    Elevated prostate specific antigen (PSA) 02/27/2023     Priority: Medium    Psoriasis 12/08/2022     Priority: Medium     Has some patchs on knees and things. Uses picrolomis and eledex. No longer seeing dermatology        Psoriatic arthritis (H) 12/08/2022     Priority: Medium     Formatting of this note might be different from the original.  Sees rheumatology and derm  Now Rinvoq- has been on several different biologics in the past      Microcytic anemia 12/13/2021     Priority: Medium     Formatting of this note might be different from the original.  Iron values are better and WNL.  Iron sulfate caused GI issues. Suspect has a component of AOCD. Will monitor.   He will start a multivitamin with iron.      Primary osteoarthritis of right knee 09/09/2021     Priority: Medium    Primary osteoarthritis of both knees 08/17/2021     Priority: Medium     Formatting of this note might be different from the original.  Per rheumatology.      Hyperlipidemia LDL goal <130 01/09/2019     Priority: Medium     Formatting of this note might be different from the original.  Not on meds      Gastroesophageal reflux disease without esophagitis 12/12/2016     Priority: Medium     Formatting of this note might be different from  "the original.  Well controled no dysphagia. Denies dark stools, BPR, and hemopytsis.  On protonix    Last Assessment & Plan:   Formatting of this note might be different from the original.  Stable continue the same.      Episodic mood disorder (H24) 05/14/2015     Priority: Medium     Formatting of this note might be different from the original.  stable on zoloft 100 mg daily  Denies HSI, AVH, anhedonia, despair    PHQ 2/24/2023   PHQ-9 Total Score 0   Q9: Thoughts of better off dead/self-harm past 2 weeks Not at all           Essential hypertension 05/14/2015     Priority: Medium     Formatting of this note is different from the original.  BP Readings from Last 3 Encounters:   12/13/21 (!) 180/110   11/09/21 (!) 181/105   08/03/21 (!) 164/92     Taking meds.  Not well controlled. Denies chest pain and SOB, swelling, dizziness or orthostatics.  Increase HCTZ and add amlodipine.         Last Assessment & Plan:   Formatting of this note might be different from the original.  Will increase lisinopril to 20 mg. Leave HCTZ at 12.5 mg      Insomnia 05/14/2015     Priority: Medium     Formatting of this note might be different from the original.  Stable on trazodone  Continue same.       Last Assessment & Plan:   Formatting of this note might be different from the original.  Stable continue the same.              Past Medical History:   No past medical history on file.  Past Surgical History:   Procedure Laterality Date    REPLACEMENT TOTAL KNEE Bilateral    # Scoliosis since adolescence:  # Hiatal hernia: on protonix continuously for 20 year  #S/p TKR in 9 and 10 of 2021: reports being bow-legged. Much improved now.    # Psoriatic arthritis: Summary from [facility] 8-22\"...Due to persistent joint swelling and pain over his hands despite chronic Humira use, treatment was switched to Otezla in Feb 2020, after discussion with Dermatology, given prior good response (was discontinued in the past due to change in " "insurance coverage). However, patient did not have as good of a response to Otezla this time around, and it was switched to Cosentyx (May 2020). Due to persistent MSK symptoms, MTX was added in June 2020. However, due to persistent disease, Cosentyx was switched to Enbrel (Sept 2020). Patient did better on combination of subQ MTX + Enbrel. SSZ was added in Aug 2021 for residual MSK symptoms, but was discontinued soon after due to significant GI side effects. Due to residual MSK symptoms on Enbrel previously, discussed switching therapy to either Stelara, Tremfya, or Taltz -- patient opted for Stelara (Nov 2021). However, patient had persistent MSK symptoms on chronic MTX + Stelara. Stelara was thus switched to Tremfya (Apr 2022)...\"         Social History:     Social History     Socioeconomic History    Marital status:      Spouse name: Not on file    Number of children: Not on file    Years of education: Not on file    Highest education level: Not on file   Occupational History    Not on file   Tobacco Use    Smoking status: Never    Smokeless tobacco: Never   Vaping Use    Vaping Use: Never used   Substance and Sexual Activity    Alcohol use: Yes     Alcohol/week: 3.0 standard drinks of alcohol     Types: 1 Glasses of wine, 1 Cans of beer, 1 Shots of liquor per week    Drug use: Never    Sexual activity: Yes     Partners: Female     Birth control/protection: Post-menopausal, Female Surgical   Other Topics Concern    Not on file   Social History Narrative    Not on file     Social Determinants of Health     Financial Resource Strain: Not on file   Food Insecurity: Not on file   Transportation Needs: Not on file   Physical Activity: Not on file   Stress: Not on file   Social Connections: Not on file   Interpersonal Safety: Not on file   Housing Stability: Not on file     Has worked as a //. Now drives [employer].  Has 2 biological children, healthy  Never " "smoker  Drinks 1 EtOH once weekly.       Family History:     Family History   Problem Relation Age of Onset    Coronary Artery Disease Mother     Bladder Cancer Father      MGF had \"skin disease\"  Mother has OA with THR an TKR         Allergies:   No Known Allergies         Medications:     Current Outpatient Medications   Medication Sig Dispense Refill    amLODIPine (NORVASC) 5 MG tablet Take 1 tablet (5 mg) by mouth daily 90 tablet 1    aspirin (ASA) 81 MG EC tablet       atorvastatin (LIPITOR) 20 MG tablet Take 1 tablet (20 mg) by mouth daily 90 tablet 1    Calcipotriene-Betameth Diprop 0.005-0.064 % SUSP Apply twice daily to your affected skin for up to 2 weeks. Strength: 0.005-0.064 % 120 g 3    hydrocortisone 2.5 % cream Apply topically 2 times daily 30 g 3    lisinopril-hydrochlorothiazide (ZESTORETIC) 20-25 MG tablet Take 1 tablet by mouth daily 90 tablet 1    multivitamin w/minerals (THERA-VIT-M) tablet Take 1 tablet by mouth daily      omega 3 1000 MG CAPS Take 2 g by mouth daily      pantoprazole (PROTONIX) 40 MG EC tablet Take 1 tablet (40 mg) by mouth daily 90 tablet 1    pimecrolimus (ELIDEL) 1 % external cream Apply topically 2 times daily 100 g 3    sertraline (ZOLOFT) 100 MG tablet Take 1 tablet (100 mg) by mouth daily 90 tablet 1    sildenafil (VIAGRA) 50 MG tablet Take 1 tablet (50 mg) by mouth daily as needed (erectile dyfunction) 20 tablet 0    tamsulosin (FLOMAX) 0.4 MG capsule Take 1 capsule (0.4 mg) by mouth daily 30 capsule 1    traZODone (DESYREL) 100 MG tablet Take 1 tablet (100 mg) by mouth At Bedtime 90 tablet 1    upadacitinib ER (RINVOQ) 15 MG tablet Take 1 tablet (15 mg) by mouth daily Hold for signs of infection, and seek medical attention. 90 tablet 2    levofloxacin (LEVAQUIN) 500 MG tablet Take 1 tablet (500 mg) by mouth daily (Patient not taking: Reported on 10/6/2023) 10 tablet 0            Physical Exam:   Blood pressure 127/84, pulse 63, weight 87.5 kg (193 lb), SpO2 96 %.  Wt " "Readings from Last 6 Encounters:   10/06/23 87.5 kg (193 lb)   02/24/23 86.3 kg (190 lb 5 oz)   12/14/22 84.6 kg (186 lb 6.4 oz)   12/08/22 84.6 kg (186 lb 6.4 oz)     Constitutional: well-developed, appearing stated age; cooperative  Eyes: nl EOM, PERRLA, vision, conjunctiva, sclera  ENT: nl external ears, nose, hearing, lips, teeth, gums, throat  No mucous membrane lesions, normal saliva pool  Neck: no mass or thyroid enlargement  Resp: breathing unlabored  Lymph: no cervical, supraclavicular, inguinal or epitrochlear nodes  MS:  angulation and bony enlargement notable at multiple DIPs in both hands, prominently right second DIP with early \"swan-neck\" change.   strength is slightly reduced right greater than left.  There is incomplete flexion of second fourth and fifth DIPs with fist formation in the right greater than left hands.  Wrist range of motion intact, as is elbow shoulder hip ankle and midfoot.  No tenderness at MTPs and no visible dactylitis or enthesopathy on lower extremity exam.  Low back shows no tenderness at SI joint; lumbar spine range of motion is intact.  There is rightward convex scoliosis in the thoracic spine.  Knees show bilateral cool modest effusions.  Well-healed anterior surgical scars.  Skin: no psoriasis visilbe at knees, low back  Neuro: nl cranial nerves, strength  Psych: nl judgement, orientation, memory, affect.         Data:     @      Latest Ref Rng & Units 2/24/2023     8:44 AM 6/28/2023     1:29 PM 10/4/2023     8:49 AM   RHEUM RESULTS   Albumin 3.5 - 5.2 g/dL 4.6  4.6  4.6    ALT 0 - 70 U/L 40  38  39    AST 0 - 45 U/L 34  30  32    Creatinine 0.67 - 1.17 mg/dL 0.93  0.99  0.99    GFR Estimate >60 mL/min/1.73m2 >90  89  89    Hematocrit 40.0 - 53.0 % 40.2  38.6  40.3    Hemoglobin 13.3 - 17.7 g/dL 13.6  13.3  13.7    WBC 4.0 - 11.0 10e3/uL 4.8  5.3  4.7    RBC Count 4.40 - 5.90 10e6/uL 5.01  4.61  4.73    RDW 10.0 - 15.0 % 14.6  13.5  12.9    MCHC 31.5 - 36.5 g/dL 33.8  " 34.5  34.0    MCV 78 - 100 fL 80  84  85    Platelet Count 150 - 450 10e3/uL 212  208  232         ,  ,  ,  ,  ,  ,  ,  ,  ,  ,  ,  ,  ,  ,  ,  ,  ,  ,  ,  ,  ,  ,  ,  ,  ,  ,  ,  ,  ,  ,  ,  ,  ,  ,  ,  ,  ,  ,  ,  ,  ,  ,  ,  ,     Rashaun Smith MD

## 2023-10-06 NOTE — PATIENT INSTRUCTIONS
Diagnosis:  1.  Psoriatic arthritis: Inflammation at several finger joints and knuckles is improved. I recommend continuing rinvoq.  2. Knee pain: unclear cause  3. Neck pain: likely muscular, non-inflammatory in origin.    Plan:  1.  Check kidney function, liver function, blood counts, and lipid panel every 4-6 months.  2.  upadacitinib 15 mg once daily for psoriatic arthritis.   3.  For residual occasional joint pain, use ibuprofen 600 to 800 mg up to twice daily as needed.  4. Followup with Orthopedics for ongoing knee pain

## 2023-10-06 NOTE — NURSING NOTE
Chief Complaint   Patient presents with    RECHECK     /84 (BP Location: Right arm, Patient Position: Sitting, Cuff Size: Adult Large)   Pulse 63   Wt 87.5 kg (193 lb)   SpO2 96%   BMI 29.35 kg/m

## 2024-01-01 NOTE — PROCEDURE: PRESCRIPTION MEDICATION MANAGEMENT
Detail Level: Zone
Continue Regimen: Taclonex prn from pcp. Betamethasone and calcipotriene
Render In Strict Bullet Format?: No
Private car

## 2024-02-01 ENCOUNTER — TELEPHONE (OUTPATIENT)
Dept: RHEUMATOLOGY | Facility: CLINIC | Age: 59
End: 2024-02-01
Payer: COMMERCIAL

## 2024-02-01 NOTE — TELEPHONE ENCOUNTER
Prior Authorization Approval    Medication: RINVOQ 15 MG PO TB24  Authorization Effective Date: 2/1/2024  Authorization Expiration Date: 1/31/2025  Approved Dose/Quantity: qd  Reference #: [id number]   Insurance Company: [employer] - Phone [phone] Fax [phone]  Expected CoPay: $    CoPay Card Available: No    Financial Assistance Needed: no  Which Pharmacy is filling the prescription: [facility] - [place], [place]  [facility] [address]  Pharmacy Notified: yes  Patient Notified: yes

## 2024-02-10 DIAGNOSIS — G47.00 INSOMNIA, UNSPECIFIED TYPE: ICD-10-CM

## 2024-02-10 DIAGNOSIS — K21.9 GASTROESOPHAGEAL REFLUX DISEASE WITHOUT ESOPHAGITIS: ICD-10-CM

## 2024-02-10 DIAGNOSIS — E78.5 HYPERLIPIDEMIA LDL GOAL <130: ICD-10-CM

## 2024-02-10 DIAGNOSIS — F33.0 MILD EPISODE OF RECURRENT MAJOR DEPRESSIVE DISORDER (H): ICD-10-CM

## 2024-02-10 DIAGNOSIS — I10 ESSENTIAL HYPERTENSION: ICD-10-CM

## 2024-02-12 RX ORDER — PANTOPRAZOLE SODIUM 40 MG/1
40 TABLET, DELAYED RELEASE ORAL DAILY
Qty: 90 TABLET | Refills: 0 | Status: SHIPPED | OUTPATIENT
Start: 2024-02-12 | End: 2024-04-15

## 2024-02-12 RX ORDER — TRAZODONE HYDROCHLORIDE 100 MG/1
100 TABLET ORAL AT BEDTIME
Qty: 90 TABLET | Refills: 0 | Status: SHIPPED | OUTPATIENT
Start: 2024-02-12 | End: 2024-04-15

## 2024-02-12 RX ORDER — SERTRALINE HYDROCHLORIDE 100 MG/1
100 TABLET, FILM COATED ORAL DAILY
Qty: 90 TABLET | Refills: 0 | Status: SHIPPED | OUTPATIENT
Start: 2024-02-12 | End: 2024-04-15

## 2024-02-12 RX ORDER — AMLODIPINE BESYLATE 5 MG/1
5 TABLET ORAL DAILY
Qty: 90 TABLET | Refills: 0 | Status: SHIPPED | OUTPATIENT
Start: 2024-02-12 | End: 2024-04-15

## 2024-02-12 RX ORDER — ATORVASTATIN CALCIUM 20 MG/1
20 TABLET, FILM COATED ORAL DAILY
Qty: 90 TABLET | Refills: 0 | Status: SHIPPED | OUTPATIENT
Start: 2024-02-12 | End: 2024-04-15

## 2024-02-12 RX ORDER — LISINOPRIL AND HYDROCHLOROTHIAZIDE 20; 25 MG/1; MG/1
1 TABLET ORAL DAILY
Qty: 90 TABLET | Refills: 0 | Status: SHIPPED | OUTPATIENT
Start: 2024-02-12 | End: 2024-04-15

## 2024-04-12 PROBLEM — K63.5 POLYP OF COLON: Status: ACTIVE | Noted: 2023-06-02

## 2024-04-12 PROBLEM — Z86.0100 HISTORY OF COLONIC POLYPS: Status: ACTIVE | Noted: 2023-06-06

## 2024-04-12 PROBLEM — K57.30 DIVERTICULAR DISEASE OF LARGE INTESTINE: Status: ACTIVE | Noted: 2023-06-02

## 2024-04-12 PROBLEM — K64.9 HEMORRHOIDS: Status: ACTIVE | Noted: 2023-06-02

## 2024-04-12 RX ORDER — CICLOPIROX 80 MG/ML
SOLUTION TOPICAL
COMMUNITY
Start: 2023-12-30

## 2024-04-15 ENCOUNTER — PATIENT OUTREACH (OUTPATIENT)
Dept: GASTROENTEROLOGY | Facility: CLINIC | Age: 59
End: 2024-04-15

## 2024-04-15 ENCOUNTER — OFFICE VISIT (OUTPATIENT)
Dept: FAMILY MEDICINE | Facility: CLINIC | Age: 59
End: 2024-04-15
Payer: COMMERCIAL

## 2024-04-15 VITALS
SYSTOLIC BLOOD PRESSURE: 130 MMHG | WEIGHT: 192 LBS | BODY MASS INDEX: 29.1 KG/M2 | TEMPERATURE: 98.1 F | HEIGHT: 68 IN | DIASTOLIC BLOOD PRESSURE: 82 MMHG | HEART RATE: 61 BPM | RESPIRATION RATE: 14 BRPM | OXYGEN SATURATION: 97 %

## 2024-04-15 DIAGNOSIS — E78.2 MIXED HYPERLIPIDEMIA: ICD-10-CM

## 2024-04-15 DIAGNOSIS — L40.50 PSORIATIC ARTHRITIS (H): ICD-10-CM

## 2024-04-15 DIAGNOSIS — Z00.00 ENCOUNTER FOR ROUTINE HISTORY AND PHYSICAL EXAM FOR MALE: Primary | ICD-10-CM

## 2024-04-15 DIAGNOSIS — G47.00 INSOMNIA, UNSPECIFIED TYPE: ICD-10-CM

## 2024-04-15 DIAGNOSIS — Z13.1 DIABETES MELLITUS SCREENING: ICD-10-CM

## 2024-04-15 DIAGNOSIS — Z11.59 NEED FOR HEPATITIS C SCREENING TEST: ICD-10-CM

## 2024-04-15 DIAGNOSIS — I10 ESSENTIAL HYPERTENSION: ICD-10-CM

## 2024-04-15 DIAGNOSIS — N52.9 ERECTILE DYSFUNCTION, UNSPECIFIED ERECTILE DYSFUNCTION TYPE: ICD-10-CM

## 2024-04-15 DIAGNOSIS — F33.0 MILD EPISODE OF RECURRENT MAJOR DEPRESSIVE DISORDER (H): ICD-10-CM

## 2024-04-15 DIAGNOSIS — Z11.4 SCREENING FOR HIV (HUMAN IMMUNODEFICIENCY VIRUS): ICD-10-CM

## 2024-04-15 DIAGNOSIS — K21.9 GASTROESOPHAGEAL REFLUX DISEASE WITHOUT ESOPHAGITIS: ICD-10-CM

## 2024-04-15 DIAGNOSIS — R97.20 ELEVATED PROSTATE SPECIFIC ANTIGEN (PSA): ICD-10-CM

## 2024-04-15 LAB
ALBUMIN UR-MCNC: NEGATIVE MG/DL
APPEARANCE UR: CLEAR
BILIRUB UR QL STRIP: NEGATIVE
COLOR UR AUTO: YELLOW
GLUCOSE UR STRIP-MCNC: NEGATIVE MG/DL
HBA1C MFR BLD: 6.3 % (ref 0–5.6)
HGB UR QL STRIP: NEGATIVE
KETONES UR STRIP-MCNC: NEGATIVE MG/DL
LEUKOCYTE ESTERASE UR QL STRIP: NEGATIVE
NITRATE UR QL: NEGATIVE
PH UR STRIP: 6 [PH] (ref 5–8)
SP GR UR STRIP: >=1.03 (ref 1–1.03)
UROBILINOGEN UR STRIP-ACNC: 0.2 E.U./DL

## 2024-04-15 PROCEDURE — 83036 HEMOGLOBIN GLYCOSYLATED A1C: CPT | Performed by: NURSE PRACTITIONER

## 2024-04-15 PROCEDURE — 87389 HIV-1 AG W/HIV-1&-2 AB AG IA: CPT | Performed by: NURSE PRACTITIONER

## 2024-04-15 PROCEDURE — 81003 URINALYSIS AUTO W/O SCOPE: CPT | Performed by: NURSE PRACTITIONER

## 2024-04-15 PROCEDURE — 99214 OFFICE O/P EST MOD 30 MIN: CPT | Mod: 25 | Performed by: NURSE PRACTITIONER

## 2024-04-15 PROCEDURE — 84443 ASSAY THYROID STIM HORMONE: CPT | Performed by: NURSE PRACTITIONER

## 2024-04-15 PROCEDURE — 80061 LIPID PANEL: CPT | Performed by: NURSE PRACTITIONER

## 2024-04-15 PROCEDURE — 36415 COLL VENOUS BLD VENIPUNCTURE: CPT | Performed by: NURSE PRACTITIONER

## 2024-04-15 PROCEDURE — 99396 PREV VISIT EST AGE 40-64: CPT | Performed by: NURSE PRACTITIONER

## 2024-04-15 PROCEDURE — 84153 ASSAY OF PSA TOTAL: CPT | Performed by: NURSE PRACTITIONER

## 2024-04-15 PROCEDURE — 86803 HEPATITIS C AB TEST: CPT | Performed by: NURSE PRACTITIONER

## 2024-04-15 RX ORDER — ATORVASTATIN CALCIUM 20 MG/1
20 TABLET, FILM COATED ORAL DAILY
Qty: 90 TABLET | Refills: 3 | Status: SHIPPED | OUTPATIENT
Start: 2024-04-15

## 2024-04-15 RX ORDER — SERTRALINE HYDROCHLORIDE 100 MG/1
100 TABLET, FILM COATED ORAL DAILY
Qty: 90 TABLET | Refills: 3 | Status: SHIPPED | OUTPATIENT
Start: 2024-04-15

## 2024-04-15 RX ORDER — LISINOPRIL AND HYDROCHLOROTHIAZIDE 20; 25 MG/1; MG/1
1 TABLET ORAL DAILY
Qty: 90 TABLET | Refills: 3 | Status: SHIPPED | OUTPATIENT
Start: 2024-04-15

## 2024-04-15 RX ORDER — PANTOPRAZOLE SODIUM 40 MG/1
40 TABLET, DELAYED RELEASE ORAL DAILY
Qty: 90 TABLET | Refills: 3 | Status: SHIPPED | OUTPATIENT
Start: 2024-04-15

## 2024-04-15 RX ORDER — TRAZODONE HYDROCHLORIDE 100 MG/1
100 TABLET ORAL AT BEDTIME
Qty: 90 TABLET | Refills: 3 | Status: SHIPPED | OUTPATIENT
Start: 2024-04-15

## 2024-04-15 RX ORDER — AMLODIPINE BESYLATE 5 MG/1
5 TABLET ORAL DAILY
Qty: 90 TABLET | Refills: 3 | Status: SHIPPED | OUTPATIENT
Start: 2024-04-15

## 2024-04-15 SDOH — HEALTH STABILITY: PHYSICAL HEALTH: ON AVERAGE, HOW MANY MINUTES DO YOU ENGAGE IN EXERCISE AT THIS LEVEL?: 10 MIN

## 2024-04-15 SDOH — HEALTH STABILITY: PHYSICAL HEALTH: ON AVERAGE, HOW MANY DAYS PER WEEK DO YOU ENGAGE IN MODERATE TO STRENUOUS EXERCISE (LIKE A BRISK WALK)?: 5 DAYS

## 2024-04-15 ASSESSMENT — PAIN SCALES - GENERAL: PAINLEVEL: NO PAIN (0)

## 2024-04-15 ASSESSMENT — PATIENT HEALTH QUESTIONNAIRE - PHQ9
SUM OF ALL RESPONSES TO PHQ QUESTIONS 1-9: 0
SUM OF ALL RESPONSES TO PHQ QUESTIONS 1-9: 0

## 2024-04-15 ASSESSMENT — SOCIAL DETERMINANTS OF HEALTH (SDOH): HOW OFTEN DO YOU GET TOGETHER WITH FRIENDS OR RELATIVES?: MORE THAN THREE TIMES A WEEK

## 2024-04-15 NOTE — PROGRESS NOTES
Assessment and Plan:     Encounter for routine history and physical exam for male  Recommend consuming a healthy diet and exercising.  He declines COVID booster.  He is otherwise up-to-date on colorectal cancer screening.  - TSH with free T4 reflex  - UA Macroscopic with reflex to Microscopic and Culture  - UA Macroscopic with reflex to Microscopic and Culture  - TSH with free T4 reflex    Diabetes mellitus screening  - Hemoglobin A1c  - Hemoglobin A1c    Screening for HIV (human immunodeficiency virus)  - HIV Antigen Antibody Combo  - HIV Antigen Antibody Combo    Need for hepatitis C screening test  - Hepatitis C Screen Reflex to HCV RNA Quant and Genotype  - Hepatitis C Screen Reflex to HCV RNA Quant and Genotype    Mixed hyperlipidemia  Will check lipid cascade.  He continues atorvastatin.  - Lipid panel reflex to direct LDL Fasting  - Lipid panel reflex to direct LDL Fasting  - atorvastatin (LIPITOR) 20 MG tablet  Dispense: 90 tablet; Refill: 3    Essential hypertension  Blood pressure improved upon recheck.  I encouraged him to monitor his blood pressure outside the clinic.  He continues amlodipine and lisinopril/hydrochlorothiazide as prescribed.  - amLODIPine (NORVASC) 5 MG tablet  Dispense: 90 tablet; Refill: 3  - lisinopril-hydrochlorothiazide (ZESTORETIC) 20-25 MG tablet  Dispense: 90 tablet; Refill: 3    Gastroesophageal reflux disease without esophagitis  This is controlled with pantoprazole.  - pantoprazole (PROTONIX) 40 MG EC tablet  Dispense: 90 tablet; Refill: 3    Mild episode of recurrent major depressive disorder (H24)  This is controlled with sertraline.  - sertraline (ZOLOFT) 100 MG tablet  Dispense: 90 tablet; Refill: 3    Erectile dysfunction, unspecified erectile dysfunction type  He continues sildenafil as needed.    Insomnia, unspecified type  Patient continues trazodone as needed.  - traZODone (DESYREL) 100 MG tablet  Dispense: 90 tablet; Refill: 3    Psoriatic arthritis (H)  Patient is  followed by rheumatology.  He continues to invoke.    Elevated prostate specific antigen (PSA)  Will recheck PSA today.  - PSA tumor marker        Subjective:     Giovanni is a 58 year old male presenting to the clinic for annual physical.  Patient has been  for 37 years.  He has 2 children and 2 grandchildren ages 5 and 2.  He does not consume a healthy diet.  He works as a CNA at [employer].  He denies exercise other than walking at work.  He previously lived in [place] and moved here to be close to his children.  Patient has hypertension and is taking amlodipine 5 mg daily and lisinopril-hydrochlorothiazide 20-25 mg daily.  He does not monitor his blood pressure outside the clinic.  He denies headaches, blurry vision, chest pain, shortness of breath with exertion, edema, orthopnea, syncope.  He is taking atorvastatin 20 mg daily for lipid management.  He has a history of an elevated PSA suspected to be from prostatitis.  He was treated with levofloxacin.  He is due for recheck today.  He is currently asymptomatic.  He is taking pantoprazole for esophageal reflux.  He was told that he has a hiatal hernia.  He is taking sertraline 100 mg daily for depression and anxiety.  Patient states this is well-controlled.  He denies thoughts of suicide.  He feels well supported.  He takes trazodone nightly to assist with insomnia.  Patient sees rheumatology for psoriatic arthritis.  He is taking Rinvoq.  He uses sildenafil as needed for male erectile dysfunction.    [employer]iewof Systems: A complete 14 point review of systems was obtained and is negative or as stated in the history of present illness.    Social History     Socioeconomic History    Marital status:      Spouse name: Not on file    Number of children: Not on file    Years of education: Not on file    Highest education level: Not on file   Occupational History    Not on file   Tobacco Use    Smoking status: Never     Passive exposure: Never    Smokeless  tobacco: Never   Vaping Use    Vaping status: Never Used   Substance and Sexual Activity    Alcohol use: Yes     Alcohol/week: 3.0 standard drinks of alcohol     Types: 1 Glasses of wine, 1 Cans of beer, 1 Shots of liquor per week    Drug use: Never    Sexual activity: Yes     Partners: Female     Birth control/protection: Post-menopausal, Female Surgical   Other Topics Concern    Not on file   Social History Narrative    Not on file     Social Determinants of Health     Financial Resource Strain: Low Risk  (4/15/2024)    Financial Resource Strain     Within the past 12 months, have you or your family members you live with been unable to get utilities (heat, electricity) when it was really needed?: No   Food Insecurity: Low Risk  (4/15/2024)    Food Insecurity     Within the past 12 months, did you worry that your food would run out before you got money to buy more?: No     Within the past 12 months, did the food you bought just not last and you didn t have money to get more?: No   Transportation Needs: Low Risk  (4/15/2024)    Transportation Needs     Within the past 12 months, has lack of transportation kept you from medical appointments, getting your medicines, non-medical meetings or appointments, work, or from getting things that you need?: No   Physical Activity: Insufficiently Active (4/15/2024)    Exercise Vital Sign     Days of Exercise per Week: 5 days     Minutes of Exercise per Session: 10 min   Stress: No Stress Concern Present (4/15/2024)    [employer] [place] [employer] - Occupational Stress Questionnaire     Feeling of Stress : Only a little   Social Connections: Unknown (4/15/2024)    Social Connection and Isolation Panel [NHANES]     Frequency of Communication with Friends and Family: Not on file     Frequency of Social Gatherings with Friends and Family: More than three times a week     Attends Shinto Services: Not on file     Active Member of Clubs or Organizations: Not on file      "Attends Club or Organization Meetings: Not on file     Marital Status: Not on file   Interpersonal Safety: Low Risk  (4/15/2024)    Interpersonal Safety     Do you feel physically and emotionally safe where you currently live?: Yes     Within the past 12 months, have you been hit, slapped, kicked or otherwise physically hurt by someone?: No     Within the past 12 months, have you been humiliated or emotionally abused in other ways by your partner or ex-partner?: No   Housing Stability: Low Risk  (4/15/2024)    Housing Stability     Do you have housing? : Yes     Are you worried about losing your housing?: No       Active Ambulatory Problems     Diagnosis Date Noted    Hyperlipidemia LDL goal <130 01/09/2019    Episodic mood disorder (H24) 05/14/2015    Essential hypertension 05/14/2015    Gastroesophageal reflux disease without esophagitis 12/12/2016    Insomnia 05/14/2015    Microcytic anemia 12/13/2021    Primary osteoarthritis of both knees 08/17/2021    Primary osteoarthritis of right knee 09/09/2021    Psoriasis 12/08/2022    Psoriatic arthritis (H) 12/08/2022    Elevated prostate specific antigen (PSA) 02/27/2023    Diverticular disease of large intestine 06/02/2023    Hemorrhoids 06/02/2023    History of colonic polyps 06/06/2023    Polyp of colon 06/02/2023     Resolved Ambulatory Problems     Diagnosis Date Noted    Chronic pain of both knees 08/06/2021     No Additional Past Medical History       Family History   Problem Relation Age of Onset    Coronary Artery Disease Mother     Bladder Cancer Father        Objective:     /82   Pulse 61   Temp 98.1  F (36.7  C)   Resp 14   Ht 1.715 m (5' 7.5\")   Wt 87.1 kg (192 lb)   SpO2 97%   BMI 29.63 kg/m      Patient is alert, in no obvious distress.   Skin: Warm, dry.  No lesions or rashes.  Skin turgor rapid return.   HEENT:  Head normocephalic, atraumatic.  Eyes normal.  PERRL.  EOM's intact.  No nystagmus. Ears normal.  Nose patent, mucosa pink.  " Oropharynx mucosa pink.  No lesions or tonsillar enlargement.   Neck: Supple, no lymphadenopathy, JVD, bruits noted.  No thyromegaly.  Lungs:  Clear to auscultation. Respirations even and unlabored.  No wheezing or rales noted.   Heart:  Regular rate and rhythm.  No murmurs, S3, S4, gallops, or rubs.    Abdomen: Soft, nontender.  No organomegaly. Bowel sounds normoactive. No guarding or masses noted.   :  deferred  Musculoskeletal:  Full ROM of extremities.    Neurological:  Cranial nerves 2-12 intact.                Answers submitted by the patient for this visit:  Patient Health Questionnaire (Submitted on 4/15/2024)  PHQ9 TOTAL SCORE: 0

## 2024-04-16 LAB
CHOLEST SERPL-MCNC: 119 MG/DL
FASTING STATUS PATIENT QL REPORTED: NORMAL
HCV AB SERPL QL IA: NONREACTIVE
HDLC SERPL-MCNC: 40 MG/DL
HIV 1+2 AB+HIV1 P24 AG SERPL QL IA: NONREACTIVE
LDLC SERPL CALC-MCNC: 67 MG/DL
NONHDLC SERPL-MCNC: 79 MG/DL
PSA SERPL DL<=0.01 NG/ML-MCNC: 2.73 NG/ML (ref 0–3.5)
TRIGL SERPL-MCNC: 60 MG/DL
TSH SERPL DL<=0.005 MIU/L-ACNC: 3.03 UIU/ML (ref 0.3–4.2)

## 2024-07-11 NOTE — PROGRESS NOTES
Rheumatology Clinic Visit  [facility]  Rashaun Smith M.D.     Giovanni Mcgill MRN# [id number]   YOB: 1965 Age: 59 year old   Date of Visit: 07/12/2024  Primary care provider: No primary care provider on file.          Assessment and Plan:     # Psoriatic arthritis: Patient describes significantly improved control of pain swelling, stiffness, and decreased range of motion in multiple finger joints, right greater than left hand.  No persistent morning stiffness.  Physical exam shows resolution of former synovial thickening at right second MCP.  Chronic changes with flexion contracture at the right 4th and fifth DIP continues. No psoriasis notable.    Data: In April 2024, lipids, urinalysis, hepatitis C, HIV were all negative.   In 2020, hepatitis B, hepatitis C, and rheumatoid factor, cyclic citrullinated peptide antibodies were negative.    Imaging: Reports of x-rays of both hands done on May 4, 2020 revealed mild bilateral osteoarthritis of the hands and thumb bases and a well-corticated triquetrium fracture in the left wrist.    Discussion: Mr. Inmans psoriasis and associated psoriatic arthritis appears to remain well controlled at this visit. He does have some noticeable contraction and symmetric angulation of the 4th and 5th DIP joint, which would be consistent with known psoriatic arthritis. However, there is no significant swelling, warmth, or tenderness on palpation of that joint, which points away from active progression of his arthritis. His clinical picture also supports this assessment, in that he feels baseline pain in his 4th, 5th DIP joints but that it does not interfere with ADLs.     In this case, would recommend continuing rinvoq 15 mg daily as planned and continue monitoring for breakthrough symptoms, which can be handled with OTC anti-inflammatories such as ibuprofen. Would recommend rechecking kidney and liver function, blood counts, and lipid panel every 6  "months.    Plan:  1.  Check kidney function, liver function, blood counts, and lipid panel every 4-6 months.  2.  upadacitinib 15 mg once daily for psoriatic arthritis.   3.  For residual occasional joint pain, use ibuprofen 600 to 800 mg up to twice daily as needed.    No orders of the defined types were placed in this encounter.    RTC 9 months    Sawyer Huynh, MS4  Staffed with Dr. Smith    I was present with the medical student who took the history and acted as a scribe. I have verified the history, reviewed imaging and laboratory data, and personally performed the physical exam. I formulated the assessment and plan.  Psoriatic arthritis is well-controlled on upadacitinib monotherapy.    Rashaun Smith M.D.  Staff Rheumatologist,  Health  Pager [phone]    Orders Placed This Encounter   Procedures    Comprehensive metabolic panel    CBC with platelets differential          On the day of the encounter, a total of 31 minutes was spent in chart review, and in counseling and coordination of care, regarding the patient's complex medical problem of psoriatic arthritis, high risk medication    The longitudinal plan of care for the diagnosis(es)/condition(s) as documented were addressed during this visit. Due to the added complexity in care, I will continue to support Giovanni in the subsequent management and with ongoing continuity of care.           History of Present Illness:   Giovanni Mcgill presents for evaluation of psoriatic arthritis. Last seen in . At that time, continued Rinvoq for psoriatic arthritis was recommended.    Bkgd: Upadacitinib was started in summer 2022 with good control of both skin and joint symptoms of psoriatic arthritis.    Notes from previous rheumatologist:    \"...Due to persistent joint swelling and pain over his hands despite chronic Humira use, treatment was switched to Otezla in Feb 2020, after discussion with Dermatology, given prior good response (was discontinued in the " past due to change in insurance coverage). However, patient did not have as good of a response to Otezla this time around, and it was switched to Cosentyx (May 2020). Due to persistent MSK symptoms, MTX was added in June 2020. However, due to persistent disease, Cosentyx was switched to Enbrel (Sept 2020). Patient did better on combination of subQ MTX + Enbrel. SSZ was added in Aug 2021 for residual MSK symptoms, but was discontinued soon after due to significant GI side effects. Due to residual MSK symptoms on Enbrel previously, discussed switching therapy to either Stelara, Tremfya, or Taltz -- patient opted for Stelara (Nov 2021). However, patient had persistent MSK symptoms on chronic MTX + Stelara. Stelara was thus switched to Tremfya (Apr 2022)...  Tremfya was discontinued in summer 2022 due to lack of efficacy.  Rinvoq was started then and continued through present.    Interval history July 12, 2024  Mr. Mcgill reports that his psoriatic arthritis and psoriasis is overall well managed on Rinvoq and topical pimecrolimus. In the past 6 months, he occasionally has experienced breakthrough plaque psoriasis lesions behind his R ear, on anterior knees, and in his gluteal crease. However, he notes that topical pimecrolimus application resolves these flares. He did also report that although Rinvoq has significantly reduced the severity of his psoriatic arthritis, he still does experience baseline pain/aching, mostly in his R hand at the 4th and 5th DIP joints. He does not endorse arthralgias or joint pains in any other joints at this visit. He is not experiencing any adverse effects from Rinvoq.    He also denies fever, fatigue, oral ulcers, other new rashes, or SOB.      Interval October 6, 2023  He notes mild aching stiffness, more in the R than L hands, persistent through time.  Overall, though, swelling, pain, and painful range of motion has improved significantly since last visit in December 2022.  No separable  "early morning stiffness. ADLS are not significantly affected. He can lift 50 lb or work for a day.  No progression of psoriasis, he uses cream on gluteal crease lesion intermittently. Extremity patches have resolved.    +intermittent L sided neck pain. Worse with rotation to the left. Pain radiates down the L neck into the shoulder. Seeing chiropractor.    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 There was tenderness and stiffness. He was shuffling, walking slow. He awakened and pain had resolved. No fever, no swelling.    Using rinvoq 15 mg daily. No AE.  Started lipitor 6 months ago.    Initial history   Patient was last seen by Rheumatologist Dr. Cardoso in [place] in 8-2022. Except from Dr. Cardoso's note containing timeline of anti-rheumatic medication use for psoriatic arthritis:    \"...Due to persistent joint swelling and pain over his hands despite chronic Humira use, treatment was switched to Otezla in Feb 2020, after discussion with Dermatology, given prior good response (was discontinued in the past due to change in insurance coverage). However, patient did not have as good of a response to Otezla this time around, and it was switched to Cosentyx (May 2020). Due to persistent MSK symptoms, MTX was added in June 2020. However, due to persistent disease, Cosentyx was switched to Enbrel (Sept 2020). Patient did better on combination of subQ MTX + Enbrel. SSZ was added in Aug 2021 for residual MSK symptoms, but was discontinued soon after due to significant GI side effects. Due to residual MSK symptoms on Enbrel previously, discussed switching therapy to either Stelara, Tremfya, or Taltz -- patient opted for Stelara (Nov 2021). However, patient had persistent MSK symptoms on chronic MTX + Stelara. Stelara was thus switched to Tremfya (Apr 2022)...\"      In 8-12 Dr. Cardoso noted persistent swelling over right wrist and right 2nd MCP on exam. Patient has had 3 " "doses of Tremfya so far (including loading dose), and he mentions having persistent polyarthritis on this and full dose subQ MTX. Denies any missed doses of DMARD therapy. Impression was of active psoriatic arthritis, recently started on Tremfya.  Plan was:    \"- Will give Tremfya a bit more time to adequately assess efficacy, or lack thereof -- continue 100mg subQ every 8 weeks as maintenance dose  - Offered prednisone vs IM corticosteroid for persistent MSK symptoms, but patient opted to hold off for now  - Continue subQ MTX 25mg weekly  - Continue folic acid 1mg daily  - Check repeat CBC/D, CMP, ESR, and CRP in 8 weeks for MTX toxicity monitoring\"    Patient saw rheumatology 1 further time in September 2022.  At that visit, lack of response to Tremfya was noted.  Recommendation was to discontinue Tremfya and to start Rinvoq daily.    Initial history December 14, 2022    Patient reports onset of swelling, redness, and pain in multiple finger and knuckle joints at around age 31.  At approximately the same time, he experienced onset of a scaly \"patch\" on the left knee, and occasional scaly patches over the belly, gluteal cleft, scalp, and chest.  He did not have psoriasis as a teenager or child.  The scaly patches were manageable with use of as needed Elidel cream.  Joint pain progressed and interfered with activities of daily living.  He reports receiving significant relief with use of tapering prednisone, but when prednisone was removed, he was started on methotrexate.  Methotrexate gave inadequate relief after 3 to 4 months.  He was then treated with etanercept with good response.  He believes he started etanercept shortly after it became FDA approved in the early 2000's, and used it continuously until approximately 2012.  At that time, etanercept was discontinued and Humira was substituted due to a change of insurance.  He had continued relief of inflammatory joint symptoms as well as reduced psoriasis with use " of Humira.  In the range of 8107-0659, he substituted Otezla for Humira; again good relief of both joint and skin symptoms were noted, but insurance dictated that Humira be restarted as of approximately 2015.  He took Humira through February 2020, when Otezla was again tried.  At that point, patient endorses the antirheumatic medication history that Dr. Taylor listed in his notes of September and July 2022.    In the last 5 months, he reports that Tremfya was discontinued after approximately 3 to 4 months trial.  He noted no change in daily joint pain.  As of early September, Tremfya was discontinued, and Rinvoq was substituted.  He reports that within 3 to 4 weeks, he noticed swelling and pain in his right second knuckle decreasing.  He was only able to continue Rinvoq for approximately 1 month before he changed residence and moved from [place] to [place].  He ran out of Rinvoq (he reports that he was given samples by previous treating rheumatologist) in mid October.  He has not used Rinvoq or methotrexate since mid October 2022.    Today he has pain in base of both thumbs, notes shape change in fingers of both hands.  Pain is gradually worsening, and difficulty with finger motion as increased in the last 6 weeks.  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  Holding and strumming the guitar has become more painful and difficult.  Lifting, gripping, opening jars are difficult because of decreased and painful painful pinch, especially right hand. He is R handed, symptoms worse consistently in that hand.  Morning stiffness in the hands is approximately 30 to 60 minutes.  He denies eye pain, GI/ symptoms, low back pain, fevers chills or sweats, or worsening of psoriasis in the last several months.  He has not used oral prednisone for over a year.  He occasionally uses ibuprofen Tylenol or Aleve once or twice monthly.         Review of Systems:   Except for HPI, negative  Constitutional: negative  Skin:  negative  Eyes: negative  Ears/Nose/Throat: negative  Respiratory: No shortness of breath, dyspnea on exertion, cough, or hemoptysis  Cardiovascular: negative  Gastrointestinal: negative  Genitourinary: negative  Musculoskeletal: negative  Neurologic: negative  Psychiatric: negative  Hematologic/Lymphatic/Immunologic: negative  Endocrine: negative         Active Problem List:     Patient Active Problem List    Diagnosis Date Noted    History of colonic polyps 06/06/2023     Priority: Medium    Diverticular disease of large intestine 06/02/2023     Priority: Medium    Hemorrhoids 06/02/2023     Priority: Medium    Polyp of colon 06/02/2023     Priority: Medium    Elevated prostate specific antigen (PSA) 02/27/2023     Priority: Medium    Psoriasis 12/08/2022     Priority: Medium     Has some patchs on knees and things. Uses picrolomis and eledex. No longer seeing dermatology        Psoriatic arthritis (H) 12/08/2022     Priority: Medium     Formatting of this note might be different from the original.  Sees rheumatology and derm  Now Rinvoq- has been on several different biologics in the past      Microcytic anemia 12/13/2021     Priority: Medium     Formatting of this note might be different from the original.  Iron values are better and WNL.  Iron sulfate caused GI issues. Suspect has a component of AOCD. Will monitor.   He will start a multivitamin with iron.      Primary osteoarthritis of right knee 09/09/2021     Priority: Medium    Primary osteoarthritis of both knees 08/17/2021     Priority: Medium     Formatting of this note might be different from the original.  Per rheumatology.      Hyperlipidemia LDL goal <130 01/09/2019     Priority: Medium     Formatting of this note might be different from the original.  Not on meds      Gastroesophageal reflux disease without esophagitis 12/12/2016     Priority: Medium     Formatting of this note might be different from the original.  Well controled no dysphagia.  "Denies dark stools, BPR, and hemopytsis.  On protonix    Last Assessment & Plan:   Formatting of this note might be different from the original.  Stable continue the same.      Episodic mood disorder (H24) 05/14/2015     Priority: Medium     Formatting of this note might be different from the original.  stable on zoloft 100 mg daily  Denies HSI, AVH, anhedonia, despair    PHQ 2/24/2023   PHQ-9 Total Score 0   Q9: Thoughts of better off dead/self-harm past 2 weeks Not at all           Essential hypertension 05/14/2015     Priority: Medium     Formatting of this note is different from the original.  BP Readings from Last 3 Encounters:   12/13/21 (!) 180/110   11/09/21 (!) 181/105   08/03/21 (!) 164/92     Taking meds.  Not well controlled. Denies chest pain and SOB, swelling, dizziness or orthostatics.  Increase HCTZ and add amlodipine.         Last Assessment & Plan:   Formatting of this note might be different from the original.  Will increase lisinopril to 20 mg. Leave HCTZ at 12.5 mg      Insomnia 05/14/2015     Priority: Medium     Formatting of this note might be different from the original.  Stable on trazodone  Continue same.       Last Assessment & Plan:   Formatting of this note might be different from the original.  Stable continue the same.              Past Medical History:   No past medical history on file.  Past Surgical History:   Procedure Laterality Date    REPLACEMENT TOTAL KNEE Bilateral    # Scoliosis since adolescence:  # Hiatal hernia: on protonix continuously for 20 year  #S/p TKR in 9 and 10 of 2021: reports being bow-legged. Much improved now.    # Psoriatic arthritis: Summary from [facility] 8-22\"...Due to persistent joint swelling and pain over his hands despite chronic Humira use, treatment was switched to Otezla in Feb 2020, after discussion with Dermatology, given prior good response (was discontinued in the past due to change in insurance coverage). However, patient did not " "have as good of a response to Otezla this time around, and it was switched to Cosentyx (May 2020). Due to persistent MSK symptoms, MTX was added in June 2020. However, due to persistent disease, Cosentyx was switched to Enbrel (Sept 2020). Patient did better on combination of subQ MTX + Enbrel. SSZ was added in Aug 2021 for residual MSK symptoms, but was discontinued soon after due to significant GI side effects. Due to residual MSK symptoms on Enbrel previously, discussed switching therapy to either Stelara, Tremfya, or Taltz -- patient opted for Stelara (Nov 2021). However, patient had persistent MSK symptoms on chronic MTX + Stelara. Stelara was thus switched to Tremfya (Apr 2022)...\"         Social History:     Social History     Socioeconomic History    Marital status:      Spouse name: Not on file    Number of children: Not on file    Years of education: Not on file    Highest education level: Not on file   Occupational History    Not on file   Tobacco Use    Smoking status: Never     Passive exposure: Never    Smokeless tobacco: Never   Vaping Use    Vaping status: Never Used   Substance and Sexual Activity    Alcohol use: Yes     Alcohol/week: 3.0 standard drinks of alcohol     Types: 1 Glasses of wine, 1 Cans of beer, 1 Shots of liquor per week    Drug use: Never    Sexual activity: Yes     Partners: Female     Birth control/protection: Post-menopausal, Female Surgical   Other Topics Concern    Not on file   Social History Narrative    Not on file     Social Determinants of Health     Financial Resource Strain: Low Risk  (4/15/2024)    Financial Resource Strain     Within the past 12 months, have you or your family members you live with been unable to get utilities (heat, electricity) when it was really needed?: No   Food Insecurity: Low Risk  (4/15/2024)    Food Insecurity     Within the past 12 months, did you worry that your food would run out before you got money to buy more?: No     Within the " past 12 months, did the food you bought just not last and you didn t have money to get more?: No   Transportation Needs: Low Risk  (4/15/2024)    Transportation Needs     Within the past 12 months, has lack of transportation kept you from medical appointments, getting your medicines, non-medical meetings or appointments, work, or from getting things that you need?: No   Physical Activity: Insufficiently Active (4/15/2024)    Exercise Vital Sign     Days of Exercise per Week: 5 days     Minutes of Exercise per Session: 10 min   Stress: No Stress Concern Present (4/15/2024)    [employer] - Occupational Stress Questionnaire     Feeling of Stress : Only a little   Social Connections: Unknown (4/15/2024)    Social Connection and Isolation Panel [NHANES]     Frequency of Communication with Friends and Family: Not on file     Frequency of Social Gatherings with Friends and Family: More than three times a week     Attends Hoahaoism Services: Not on file     Active Member of Clubs or Organizations: Not on file     Attends Club or Organization Meetings: Not on file     Marital Status: Not on file   Interpersonal Safety: Low Risk  (4/15/2024)    Interpersonal Safety     Do you feel physically and emotionally safe where you currently live?: Yes     Within the past 12 months, have you been hit, slapped, kicked or otherwise physically hurt by someone?: No     Within the past 12 months, have you been humiliated or emotionally abused in other ways by your partner or ex-partner?: No   Housing Stability: Low Risk  (4/15/2024)    Housing Stability     Do you have housing? : Yes     Are you worried about losing your housing?: No     Has worked as a //. 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  Has 2 biological children, healthy  Never smoker  Drinks 1 EtOH once weekly.       Family History:     Family History   Problem Relation Age of Onset    Coronary Artery Disease Mother      "Bladder Cancer Father      MGF had \"skin disease\"  Mother has OA with THR an TKR         Allergies:   No Known Allergies         Medications:     Current Outpatient Medications   Medication Sig Dispense Refill    amLODIPine (NORVASC) 5 MG tablet Take 1 tablet (5 mg) by mouth daily 90 tablet 3    aspirin (ASA) 81 MG EC tablet       atorvastatin (LIPITOR) 20 MG tablet Take 1 tablet (20 mg) by mouth daily 90 tablet 3    Calcipotriene-Betameth Diprop 0.005-0.064 % SUSP Apply twice daily to your affected skin for up to 2 weeks. Strength: 0.005-0.064 % 120 g 3    ciclopirox (PENLAC) 8 % external solution APPLY TO NAILS ONCEDAILY AT NIGHT.WASH OFF ONCE WEEKLY WITH NAIL POLISH REMOVER      hydrocortisone 2.5 % cream Apply topically 2 times daily 30 g 3    lisinopril-hydrochlorothiazide (ZESTORETIC) 20-25 MG tablet Take 1 tablet by mouth daily 90 tablet 3    multivitamin w/minerals (THERA-VIT-M) tablet Take 1 tablet by mouth daily      omega 3 1000 MG CAPS Take 2 g by mouth daily      pantoprazole (PROTONIX) 40 MG EC tablet Take 1 tablet (40 mg) by mouth daily 90 tablet 3    pimecrolimus (ELIDEL) 1 % external cream Apply topically 2 times daily 100 g 3    sertraline (ZOLOFT) 100 MG tablet Take 1 tablet (100 mg) by mouth daily 90 tablet 3    sildenafil (VIAGRA) 50 MG tablet Take 1 tablet (50 mg) by mouth daily as needed (erectile dyfunction) 20 tablet 0    traZODone (DESYREL) 100 MG tablet Take 1 tablet (100 mg) by mouth at bedtime 90 tablet 3    upadacitinib ER (RINVOQ) 15 MG tablet Take 1 tablet (15 mg) by mouth daily Hold for signs of infection, and seek medical attention. 90 tablet 2            Physical Exam:   There were no vitals taken for this visit.  Wt Readings from Last 6 Encounters:   04/15/24 87.1 kg (192 lb)   10/06/23 87.5 kg (193 lb)   02/24/23 86.3 kg (190 lb 5 oz)   12/14/22 84.6 kg (186 lb 6.4 oz)   12/08/22 84.6 kg (186 lb 6.4 oz)     Constitutional: well-developed, appearing stated age; cooperative  Resp: " "breathing unlabored  MSK:  angulation and bony enlargement notable at multiple DIPs in both hands, prominently right fourth and fifth DIP joints with \"swan-neck\" change.  strength intact. There is incomplete flexion of second fourth and fifth DIPs with fist formation in the right greater than left hands.  Wrist range of motion intact, as is elbow shoulder hip ankle and midfoot.  No tenderness at MTPs and no visible dactylitis or enthesopathy on lower extremity exam. Cyst-like growth noted on the R popliteal region.   Skin: no psoriasis visible at knees, ears           Data:     @      Latest Ref Rng & Units 6/28/2023     1:29 PM 10/4/2023     8:49 AM 4/15/2024     9:00 AM   RHEUM RESULTS   Albumin 3.5 - 5.2 g/dL 4.6  4.6     ALT 0 - 70 U/L 38  39     AST 0 - 45 U/L 30  32     Creatinine 0.67 - 1.17 mg/dL 0.99  0.99     GFR Estimate >60 mL/min/1.73m2 89  89     Hematocrit 40.0 - 53.0 % 38.6  40.3     Hemoglobin 13.3 - 17.7 g/dL 13.3  13.7     Hepatitis C Antibody Nonreactive   Nonreactive    WBC 4.0 - 11.0 10e3/uL 5.3  4.7     RBC Count 4.40 - 5.90 10e6/uL 4.61  4.73     RDW 10.0 - 15.0 % 13.5  12.9     MCHC 31.5 - 36.5 g/dL 34.5  34.0     MCV 78 - 100 fL 84  85     Platelet Count 150 - 450 10e3/uL 208  232          ,  ,  ,  ,  ,  ,  ,  ,  ,  ,  ,  ,  ,  ,  ,  ,  ,  ,  ,  ,  ,  ,  ,  ,  ,  ,  ,  ,  ,  ,  ,  ,  ,  ,  ,  ,  ,  ,  ,  ,  ,  ,  ,  ,     "

## 2024-07-12 ENCOUNTER — OFFICE VISIT (OUTPATIENT)
Dept: RHEUMATOLOGY | Facility: CLINIC | Age: 59
End: 2024-07-12
Attending: INTERNAL MEDICINE
Payer: COMMERCIAL

## 2024-07-12 VITALS
WEIGHT: 193.8 LBS | BODY MASS INDEX: 29.91 KG/M2 | DIASTOLIC BLOOD PRESSURE: 89 MMHG | HEART RATE: 56 BPM | SYSTOLIC BLOOD PRESSURE: 183 MMHG | OXYGEN SATURATION: 97 %

## 2024-07-12 DIAGNOSIS — L40.50 PSORIATIC ARTHRITIS (H): ICD-10-CM

## 2024-07-12 DIAGNOSIS — Z79.899 HIGH RISK MEDICATION USE: Primary | ICD-10-CM

## 2024-07-12 PROCEDURE — 99214 OFFICE O/P EST MOD 30 MIN: CPT | Performed by: INTERNAL MEDICINE

## 2024-07-12 PROCEDURE — G2211 COMPLEX E/M VISIT ADD ON: HCPCS | Performed by: INTERNAL MEDICINE

## 2024-07-12 PROCEDURE — 99213 OFFICE O/P EST LOW 20 MIN: CPT | Performed by: INTERNAL MEDICINE

## 2024-07-12 RX ORDER — UPADACITINIB 15 MG/1
15 TABLET, EXTENDED RELEASE ORAL DAILY
Qty: 90 TABLET | Refills: 2 | Status: SHIPPED | OUTPATIENT
Start: 2024-07-12

## 2024-07-12 NOTE — PATIENT INSTRUCTIONS
Diagnosis:  1.  Psoriatic arthritis: Inflammation at several finger joints and knuckles is improved. I recommend continuing rinvoq.  2. Knee pain: unclear cause  3. Neck pain: likely muscular, non-inflammatory in origin.    Plan:  1.  Check kidney function, liver function, blood counts, and lipid panel every 4-6 months.  2.  upadacitinib 15 mg once daily for psoriatic arthritis.   3.  For residual occasional joint pain, use ibuprofen 600 to 800 mg up to twice daily as needed.

## 2024-07-12 NOTE — LETTER
7/12/2024       RE: Giovanni Mcgill  [address]  [address]  [facility] [address]     Dear Colleague,    Thank you for referring your patient, Giovanni Mcgill, to the [facility] [place] at [facility]. Please see a copy of my visit note below.    Rheumatology Clinic Visit  [facility]  Rashaun Smith M.D.     Giovanni Mcgill MRN# [id number]   YOB: 1965 Age: 59 year old   Date of Visit: 07/12/2024  Primary care provider: No primary care provider on file.          Assessment and Plan:     # Psoriatic arthritis: Patient describes significantly improved control of pain swelling, stiffness, and decreased range of motion in multiple finger joints, right greater than left hand.  No persistent morning stiffness.  Physical exam shows resolution of former synovial thickening at right second MCP.  Chronic changes with flexion contracture at the right 4th and fifth DIP continues. No psoriasis notable.    Data: In April 2024, lipids, urinalysis, hepatitis C, HIV were all negative.   In 2020, hepatitis B, hepatitis C, and rheumatoid factor, cyclic citrullinated peptide antibodies were negative.    Imaging: Reports of x-rays of both hands done on May 4, 2020 revealed mild bilateral osteoarthritis of the hands and thumb bases and a well-corticated triquetrium fracture in the left wrist.    Discussion: Mr. Inmans psoriasis and associated psoriatic arthritis appears to remain well controlled at this visit. He does have some noticeable contraction and symmetric angulation of the 4th and 5th DIP joint, which would be consistent with known psoriatic arthritis. However, there is no significant swelling, warmth, or tenderness on palpation of that joint, which points away from active progression of his arthritis. His clinical picture also supports this assessment, in that he feels baseline pain in his 4th, 5th DIP joints but that it does not  interfere with ADLs.     In this case, would recommend continuing rinvoq 15 mg daily as planned and continue monitoring for breakthrough symptoms, which can be handled with OTC anti-inflammatories such as ibuprofen. Would recommend rechecking kidney and liver function, blood counts, and lipid panel every 6 months.    Plan:  1.  Check kidney function, liver function, blood counts, and lipid panel every 4-6 months.  2.  upadacitinib 15 mg once daily for psoriatic arthritis.   3.  For residual occasional joint pain, use ibuprofen 600 to 800 mg up to twice daily as needed.    No orders of the defined types were placed in this encounter.    RTC 9 months    Sawyer Huynh, MS4  Staffed with Dr. Smith    I was present with the medical student who took the history and acted as a scribe. I have verified the history, reviewed imaging and laboratory data, and personally performed the physical exam. I formulated the assessment and plan.  Psoriatic arthritis is well-controlled on upadacitinib monotherapy.    Rashaun Smith M.D.  Staff Rheumatologist, [facility]  Pager [phone]    Orders Placed This Encounter   Procedures     Comprehensive metabolic panel     CBC with platelets differential          On the day of the encounter, a total of 31 minutes was spent in chart review, and in counseling and coordination of care, regarding the patient's complex medical problem of psoriatic arthritis, high risk medication    The longitudinal plan of care for the diagnosis(es)/condition(s) as documented were addressed during this visit. Due to the added complexity in care, I will continue to support Giovanni in the subsequent management and with ongoing continuity of care.           History of Present Illness:   Giovanni CARO Mcgill presents for evaluation of psoriatic arthritis. Last seen in . At that time, continued Rinvoq for psoriatic arthritis was recommended.    Bkgd: Upadacitinib was started in summer 2022 with good control of both  "skin and joint symptoms of psoriatic arthritis.    Notes from previous rheumatologist:    \"...Due to persistent joint swelling and pain over his hands despite chronic Humira use, treatment was switched to Otezla in Feb 2020, after discussion with Dermatology, given prior good response (was discontinued in the past due to change in insurance coverage). However, patient did not have as good of a response to Otezla this time around, and it was switched to Cosentyx (May 2020). Due to persistent MSK symptoms, MTX was added in June 2020. However, due to persistent disease, Cosentyx was switched to Enbrel (Sept 2020). Patient did better on combination of subQ MTX + Enbrel. SSZ was added in Aug 2021 for residual MSK symptoms, but was discontinued soon after due to significant GI side effects. Due to residual MSK symptoms on Enbrel previously, discussed switching therapy to either Stelara, Tremfya, or Taltz -- patient opted for Stelara (Nov 2021). However, patient had persistent MSK symptoms on chronic MTX + Stelara. Stelara was thus switched to Tremfya (Apr 2022)...  Tremfya was discontinued in summer 2022 due to lack of efficacy.  Rinvoq was started then and continued through present.    Interval history July 12, 2024  Mr. Mcgill reports that his psoriatic arthritis and psoriasis is overall well managed on Rinvoq and topical pimecrolimus. In the past 6 months, he occasionally has experienced breakthrough plaque psoriasis lesions behind his R ear, on anterior knees, and in his gluteal crease. However, he notes that topical pimecrolimus application resolves these flares. He did also report that although Rinvoq has significantly reduced the severity of his psoriatic arthritis, he still does experience baseline pain/aching, mostly in his R hand at the 4th and 5th DIP joints. He does not endorse arthralgias or joint pains in any other joints at this visit. He is not experiencing any adverse effects from Rinvoq.    He also denies " "fever, fatigue, oral ulcers, other new rashes, or SOB.      Interval October 6, 2023  He notes mild aching stiffness, more in the R than L hands, persistent through time.  Overall, though, swelling, pain, and painful range of motion has improved significantly since last visit in December 2022.  No separable early morning stiffness. ADLS are not significantly affected. He can lift 50 lb or work for a day.  No progression of psoriasis, he uses cream on gluteal crease lesion intermittently. Extremity patches have resolved.    +intermittent L sided neck pain. Worse with rotation to the left. Pain radiates down the L neck into the shoulder. Seeing chiropractor.    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 There was tenderness and stiffness. He was shuffling, walking slow. He awakened and pain had resolved. No fever, no swelling.    Using rinvoq 15 mg daily. No AE.  Started lipitor 6 months ago.    Initial history   Patient was last seen by Rheumatologist Dr. Cardoso in [place] in 8-2022. Except from Dr. Cardoso's note containing timeline of anti-rheumatic medication use for psoriatic arthritis:    \"...Due to persistent joint swelling and pain over his hands despite chronic Humira use, treatment was switched to Otezla in Feb 2020, after discussion with Dermatology, given prior good response (was discontinued in the past due to change in insurance coverage). However, patient did not have as good of a response to Otezla this time around, and it was switched to Cosentyx (May 2020). Due to persistent MSK symptoms, MTX was added in June 2020. However, due to persistent disease, Cosentyx was switched to Enbrel (Sept 2020). Patient did better on combination of subQ MTX + Enbrel. SSZ was added in Aug 2021 for residual MSK symptoms, but was discontinued soon after due to significant GI side effects. Due to residual MSK symptoms on Enbrel previously, discussed switching therapy to " "either Stelara, Tremfya, or Taltz -- patient opted for Stelara (Nov 2021). However, patient had persistent MSK symptoms on chronic MTX + Stelara. Stelara was thus switched to Tremfya (Apr 2022)...\"      In 8-12 Dr. Cardoso noted persistent swelling over right wrist and right 2nd MCP on exam. Patient has had 3 doses of Tremfya so far (including loading dose), and he mentions having persistent polyarthritis on this and full dose subQ MTX. Denies any missed doses of DMARD therapy. Impression was of active psoriatic arthritis, recently started on Tremfya.  Plan was:    \"- Will give Tremfya a bit more time to adequately assess efficacy, or lack thereof -- continue 100mg subQ every 8 weeks as maintenance dose  - Offered prednisone vs IM corticosteroid for persistent MSK symptoms, but patient opted to hold off for now  - Continue subQ MTX 25mg weekly  - Continue folic acid 1mg daily  - Check repeat CBC/D, CMP, ESR, and CRP in 8 weeks for MTX toxicity monitoring\"    Patient saw rheumatology 1 further time in September 2022.  At that visit, lack of response to Tremfya was noted.  Recommendation was to discontinue Tremfya and to start Rinvoq daily.    Initial history December 14, 2022    Patient reports onset of swelling, redness, and pain in multiple finger and knuckle joints at around age 31.  At approximately the same time, he experienced onset of a scaly \"patch\" on the left knee, and occasional scaly patches over the belly, gluteal cleft, scalp, and chest.  He did not have psoriasis as a teenager or child.  The scaly patches were manageable with use of as needed Elidel cream.  Joint pain progressed and interfered with activities of daily living.  He reports receiving significant relief with use of tapering prednisone, but when prednisone was removed, he was started on methotrexate.  Methotrexate gave inadequate relief after 3 to 4 months.  He was then treated with etanercept with good response.  He believes he started " etanercept shortly after it became FDA approved in the early 2000's, and used it continuously until approximately 2012.  At that time, etanercept was discontinued and Humira was substituted due to a change of insurance.  He had continued relief of inflammatory joint symptoms as well as reduced psoriasis with use of Humira.  In the range of 2766-7379, he substituted Otezla for Humira; again good relief of both joint and skin symptoms were noted, but insurance dictated that Humira be restarted as of approximately 2015.  He took Humira through February 2020, when Otezla was again tried.  At that point, patient endorses the antirheumatic medication history that Dr. Taylor listed in his notes of September and July 2022.    In the last 5 months, he reports that Tremfya was discontinued after approximately 3 to 4 months trial.  He noted no change in daily joint pain.  As of early September, Tremfya was discontinued, and Rinvoq was substituted.  He reports that within 3 to 4 weeks, he noticed swelling and pain in his right second knuckle decreasing.  He was only able to continue Rinvoq for approximately 1 month before he changed residence and moved from [place] to [place].  He ran out of Rinvoq (he reports that he was given samples by previous treating rheumatologist) in mid October.  He has not used Rinvoq or methotrexate since mid October 2022.    Today he has pain in base of both thumbs, notes shape change in fingers of both hands.  Pain is gradually worsening, and difficulty with finger motion as increased in the last 6 weeks.  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  Holding and strumming the guitar has become more painful and difficult.  Lifting, gripping, opening jars are difficult because of decreased and painful painful pinch, especially right hand. He is R handed, symptoms worse consistently in that hand.  Morning stiffness in the hands is approximately 30 to 60 minutes.  He denies eye pain, GI/  symptoms, low back pain, fevers chills or sweats, or worsening of psoriasis in the last several months.  He has not used oral prednisone for over a year.  He occasionally uses ibuprofen Tylenol or Aleve once or twice monthly.         Review of Systems:   Except for HPI, negative  Constitutional: negative  Skin: negative  Eyes: negative  Ears/Nose/Throat: negative  Respiratory: No shortness of breath, dyspnea on exertion, cough, or hemoptysis  Cardiovascular: negative  Gastrointestinal: negative  Genitourinary: negative  Musculoskeletal: negative  Neurologic: negative  Psychiatric: negative  Hematologic/Lymphatic/Immunologic: negative  Endocrine: negative         Active Problem List:     Patient Active Problem List    Diagnosis Date Noted     History of colonic polyps 06/06/2023     Priority: Medium     Diverticular disease of large intestine 06/02/2023     Priority: Medium     Hemorrhoids 06/02/2023     Priority: Medium     Polyp of colon 06/02/2023     Priority: Medium     Elevated prostate specific antigen (PSA) 02/27/2023     Priority: Medium     Psoriasis 12/08/2022     Priority: Medium     Has some patchs on knees and things. Uses picrolomis and eledex. No longer seeing dermatology         Psoriatic arthritis (H) 12/08/2022     Priority: Medium     Formatting of this note might be different from the original.  Sees rheumatology and derm  Now Rinvoq- has been on several different biologics in the past       Microcytic anemia 12/13/2021     Priority: Medium     Formatting of this note might be different from the original.  Iron values are better and WNL.  Iron sulfate caused GI issues. Suspect has a component of AOCD. Will monitor.   He will start a multivitamin with iron.       Primary osteoarthritis of right knee 09/09/2021     Priority: Medium     Primary osteoarthritis of both knees 08/17/2021     Priority: Medium     Formatting of this note might be different from the original.  Per rheumatology.        Hyperlipidemia LDL goal <130 01/09/2019     Priority: Medium     Formatting of this note might be different from the original.  Not on meds       Gastroesophageal reflux disease without esophagitis 12/12/2016     Priority: Medium     Formatting of this note might be different from the original.  Well controled no dysphagia. Denies dark stools, BPR, and hemopytsis.  On protonix    Last Assessment & Plan:   Formatting of this note might be different from the original.  Stable continue the same.       Episodic mood disorder (H24) 05/14/2015     Priority: Medium     Formatting of this note might be different from the original.  stable on zoloft 100 mg daily  Denies HSI, AVH, anhedonia, despair    PHQ 2/24/2023   PHQ-9 Total Score 0   Q9: Thoughts of better off dead/self-harm past 2 weeks Not at all            Essential hypertension 05/14/2015     Priority: Medium     Formatting of this note is different from the original.  BP Readings from Last 3 Encounters:   12/13/21 (!) 180/110   11/09/21 (!) 181/105   08/03/21 (!) 164/92     Taking meds.  Not well controlled. Denies chest pain and SOB, swelling, dizziness or orthostatics.  Increase HCTZ and add amlodipine.         Last Assessment & Plan:   Formatting of this note might be different from the original.  Will increase lisinopril to 20 mg. Leave HCTZ at 12.5 mg       Insomnia 05/14/2015     Priority: Medium     Formatting of this note might be different from the original.  Stable on trazodone  Continue same.       Last Assessment & Plan:   Formatting of this note might be different from the original.  Stable continue the same.              Past Medical History:   No past medical history on file.  Past Surgical History:   Procedure Laterality Date     REPLACEMENT TOTAL KNEE Bilateral    # Scoliosis since adolescence:  # Hiatal hernia: on protonix continuously for 20 year  #S/p TKR in 9 and 10 of 2021: reports being bow-legged. Much improved now.    # Psoriatic arthritis:  "Summary from [facility] 8-22\"...Due to persistent joint swelling and pain over his hands despite chronic Humira use, treatment was switched to Otezla in Feb 2020, after discussion with Dermatology, given prior good response (was discontinued in the past due to change in insurance coverage). However, patient did not have as good of a response to Otezla this time around, and it was switched to Cosentyx (May 2020). Due to persistent MSK symptoms, MTX was added in June 2020. However, due to persistent disease, Cosentyx was switched to Enbrel (Sept 2020). Patient did better on combination of subQ MTX + Enbrel. SSZ was added in Aug 2021 for residual MSK symptoms, but was discontinued soon after due to significant GI side effects. Due to residual MSK symptoms on Enbrel previously, discussed switching therapy to either Stelara, Tremfya, or Taltz -- patient opted for Stelara (Nov 2021). However, patient had persistent MSK symptoms on chronic MTX + Stelara. Stelara was thus switched to Tremfya (Apr 2022)...\"         Social History:     Social History     Socioeconomic History     Marital status:      Spouse name: Not on file     Number of children: Not on file     Years of education: Not on file     Highest education level: Not on file   Occupational History     Not on file   Tobacco Use     Smoking status: Never     Passive exposure: Never     Smokeless tobacco: Never   Vaping Use     Vaping status: Never Used   Substance and Sexual Activity     Alcohol use: Yes     Alcohol/week: 3.0 standard drinks of alcohol     Types: 1 Glasses of wine, 1 Cans of beer, 1 Shots of liquor per week     Drug use: Never     Sexual activity: Yes     Partners: Female     Birth control/protection: Post-menopausal, Female Surgical   Other Topics Concern     Not on file   Social History Narrative     Not on file     Social Determinants of Health     Financial Resource Strain: Low Risk  (4/15/2024)    Financial Resource Strain      " Within the past 12 months, have you or your family members you live with been unable to get utilities (heat, electricity) when it was really needed?: No   Food Insecurity: Low Risk  (4/15/2024)    Food Insecurity      Within the past 12 months, did you worry that your food would run out before you got money to buy more?: No      Within the past 12 months, did the food you bought just not last and you didn t have money to get more?: No   Transportation Needs: Low Risk  (4/15/2024)    Transportation Needs      Within the past 12 months, has lack of transportation kept you from medical appointments, getting your medicines, non-medical meetings or appointments, work, or from getting things that you need?: No   Physical Activity: Insufficiently Active (4/15/2024)    Exercise Vital Sign      Days of Exercise per Week: 5 days      Minutes of Exercise per Session: 10 min   Stress: No Stress Concern Present (4/15/2024)    [employer] - Occupational Stress Questionnaire      Feeling of Stress : Only a little   Social Connections: Unknown (4/15/2024)    Social Connection and Isolation Panel [NHANES]      Frequency of Communication with Friends and Family: Not on file      Frequency of Social Gatherings with Friends and Family: More than three times a week      Attends [employer] Services: Not on file      Active Member of Clubs or Organizations: Not on file      Attends Club or Organization Meetings: Not on file      Marital Status: Not on file   Interpersonal Safety: Low Risk  (4/15/2024)    Interpersonal Safety      Do you feel physically and emotionally safe where you currently live?: Yes      Within the past 12 months, have you been hit, slapped, kicked or otherwise physically hurt by someone?: No      Within the past 12 months, have you been humiliated or emotionally abused in other ways by your partner or ex-partner?: No   Housing Stability: Low Risk  (4/15/2024)    Housing Stability      Do  "you have housing? : Yes      Are you worried about losing your housing?: No     Has worked as a //. 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  Has 2 biological children, healthy  Never smoker  Drinks 1 EtOH once weekly.       Family History:     Family History   Problem Relation Age of Onset     Coronary Artery Disease Mother      Bladder Cancer Father      MGF had \"skin disease\"  Mother has OA with THR an TKR         Allergies:   No Known Allergies         Medications:     Current Outpatient Medications   Medication Sig Dispense Refill     amLODIPine (NORVASC) 5 MG tablet Take 1 tablet (5 mg) by mouth daily 90 tablet 3     aspirin (ASA) 81 MG EC tablet        atorvastatin (LIPITOR) 20 MG tablet Take 1 tablet (20 mg) by mouth daily 90 tablet 3     Calcipotriene-Betameth Diprop 0.005-0.064 % SUSP Apply twice daily to your affected skin for up to 2 weeks. Strength: 0.005-0.064 % 120 g 3     ciclopirox (PENLAC) 8 % external solution APPLY TO NAILS ONCEDAILY AT NIGHT.WASH OFF ONCE WEEKLY WITH NAIL POLISH REMOVER       hydrocortisone 2.5 % cream Apply topically 2 times daily 30 g 3     lisinopril-hydrochlorothiazide (ZESTORETIC) 20-25 MG tablet Take 1 tablet by mouth daily 90 tablet 3     multivitamin w/minerals (THERA-VIT-M) tablet Take 1 tablet by mouth daily       omega 3 1000 MG CAPS Take 2 g by mouth daily       pantoprazole (PROTONIX) 40 MG EC tablet Take 1 tablet (40 mg) by mouth daily 90 tablet 3     pimecrolimus (ELIDEL) 1 % external cream Apply topically 2 times daily 100 g 3     sertraline (ZOLOFT) 100 MG tablet Take 1 tablet (100 mg) by mouth daily 90 tablet 3     sildenafil (VIAGRA) 50 MG tablet Take 1 tablet (50 mg) by mouth daily as needed (erectile dyfunction) 20 tablet 0     traZODone (DESYREL) 100 MG tablet Take 1 tablet (100 mg) by mouth at bedtime 90 tablet 3     upadacitinib ER (RINVOQ) 15 MG tablet Take 1 tablet (15 mg) by mouth daily Hold for signs of infection, and seek " "medical attention. 90 tablet 2            Physical Exam:   There were no vitals taken for this visit.  Wt Readings from Last 6 Encounters:   04/15/24 87.1 kg (192 lb)   10/06/23 87.5 kg (193 lb)   02/24/23 86.3 kg (190 lb 5 oz)   12/14/22 84.6 kg (186 lb 6.4 oz)   12/08/22 84.6 kg (186 lb 6.4 oz)     Constitutional: well-developed, appearing stated age; cooperative  Resp: breathing unlabored  MSK:  angulation and bony enlargement notable at multiple DIPs in both hands, prominently right fourth and fifth DIP joints with \"swan-neck\" change.  strength intact. There is incomplete flexion of second fourth and fifth DIPs with fist formation in the right greater than left hands.  Wrist range of motion intact, as is elbow shoulder hip ankle and midfoot.  No tenderness at MTPs and no visible dactylitis or enthesopathy on lower extremity exam. Cyst-like growth noted on the R popliteal region.   Skin: no psoriasis visible at knees, ears           Data:     @      Latest Ref Rng & Units 6/28/2023     1:29 PM 10/4/2023     8:49 AM 4/15/2024     9:00 AM   RHEUM RESULTS   Albumin 3.5 - 5.2 g/dL 4.6  4.6     ALT 0 - 70 U/L 38  39     AST 0 - 45 U/L 30  32     Creatinine 0.67 - 1.17 mg/dL 0.99  0.99     GFR Estimate >60 mL/min/1.73m2 89  89     Hematocrit 40.0 - 53.0 % 38.6  40.3     Hemoglobin 13.3 - 17.7 g/dL 13.3  13.7     Hepatitis C Antibody Nonreactive   Nonreactive    WBC 4.0 - 11.0 10e3/uL 5.3  4.7     RBC Count 4.40 - 5.90 10e6/uL 4.61  4.73     RDW 10.0 - 15.0 % 13.5  12.9     MCHC 31.5 - 36.5 g/dL 34.5  34.0     MCV 78 - 100 fL 84  85     Platelet Count 150 - 450 10e3/uL 208  232          ,  ,  ,  ,  ,  ,  ,  ,  ,  ,  ,  ,  ,  ,  ,  ,  ,  ,  ,  ,  ,  ,  ,  ,  ,  ,  ,  ,  ,  ,  ,  ,  ,  ,  ,  ,  ,  ,  ,  ,  ,  ,  ,  ,         Again, thank you for allowing me to participate in the care of your patient.      Sincerely,    Rashaun Smith MD    "

## 2024-07-17 ENCOUNTER — TELEPHONE (OUTPATIENT)
Dept: RHEUMATOLOGY | Facility: CLINIC | Age: 59
End: 2024-07-17
Payer: COMMERCIAL

## 2024-07-17 NOTE — TELEPHONE ENCOUNTER
Left Voicemail (1st Attempt) and Sent Mychart (1st Attempt) for the patient to call back and schedule the following:    Appointment type: Return Rheumatology  Provider: Dr. Smith  Return date: March 12th 2025  Specialty phone number: [phone]  Additional appointment(s) needed: NA  Additonal Notes: NA

## 2024-07-26 NOTE — RESULT ENCOUNTER NOTE
Problem: Discharge Planning  Goal: Discharge to home or other facility with appropriate resources  Outcome: Progressing     Problem: Skin/Tissue Integrity  Goal: Absence of new skin breakdown  Description: 1.  Monitor for areas of redness and/or skin breakdown  2.  Assess vascular access sites hourly  3.  Every 4-6 hours minimum:  Change oxygen saturation probe site  4.  Every 4-6 hours:  If on nasal continuous positive airway pressure, respiratory therapy assess nares and determine need for appliance change or resting period.  Outcome: Progressing     Problem: Safety - Adult  Goal: Free from fall injury  Outcome: Progressing      I called and spoke with the patient about their recent clinic visit results. I answered any questions they had.  We will start statin for ASCVD risk at 12.5%.  Also sent a referral to urology for elevated PSA      Dr. Yamil Balbuena

## 2024-07-28 ENCOUNTER — MYC REFILL (OUTPATIENT)
Dept: RHEUMATOLOGY | Facility: CLINIC | Age: 59
End: 2024-07-28
Payer: COMMERCIAL

## 2024-07-28 ENCOUNTER — MYC REFILL (OUTPATIENT)
Dept: FAMILY MEDICINE | Facility: CLINIC | Age: 59
End: 2024-07-28
Payer: COMMERCIAL

## 2024-07-28 DIAGNOSIS — L40.50 PSORIATIC ARTHRITIS (H): ICD-10-CM

## 2024-07-28 DIAGNOSIS — K21.9 GASTROESOPHAGEAL REFLUX DISEASE WITHOUT ESOPHAGITIS: ICD-10-CM

## 2024-07-28 RX ORDER — UPADACITINIB 15 MG/1
15 TABLET, EXTENDED RELEASE ORAL DAILY
Qty: 90 TABLET | Refills: 2 | Status: CANCELLED | OUTPATIENT
Start: 2024-07-28

## 2024-07-29 RX ORDER — PANTOPRAZOLE SODIUM 40 MG/1
40 TABLET, DELAYED RELEASE ORAL DAILY
Qty: 90 TABLET | Refills: 3 | OUTPATIENT
Start: 2024-07-29

## 2024-07-29 NOTE — TELEPHONE ENCOUNTER
Left Voicemail (2nd Attempt) for the patient to call back and schedule the following:    Appointment type: Return Rheumatology  Provider: Dr. Smith  Return date: March 11th 2025  Specialty phone number: [phone]  Additional appointment(s) needed: NA  Additonal Notes: NA

## 2024-07-31 NOTE — TELEPHONE ENCOUNTER
Thanks.  Your refill has been approved and sent to your pharmacy on 7/12/24.upadacitinib ER (RINVOQ) 15 MG tablet was sent  for 90 tablets and 2 refills to :   [facility] - [place], [place] - [address]      Please contact your pharmacy directly  to have medication delivered. The number to contact is [phone].    [phone].   SAMEER Pastor R.N.   [facility] Centralized Medication Refill  Department

## 2024-08-23 ENCOUNTER — MYC MEDICAL ADVICE (OUTPATIENT)
Dept: FAMILY MEDICINE | Facility: CLINIC | Age: 59
End: 2024-08-23
Payer: COMMERCIAL

## 2024-08-23 NOTE — TELEPHONE ENCOUNTER
Patient Quality Outreach    Patient is due for the following:   Hypertension -  BP check    Next Steps:   Patient needs to check their blood pressure and send the blood pressure readings into the clinic for a provider to review.    Type of outreach:    Sent A.C. Moore message.      Questions for provider review:    None           Chioma Smith RN

## 2024-09-27 ENCOUNTER — MYC REFILL (OUTPATIENT)
Dept: RHEUMATOLOGY | Facility: CLINIC | Age: 59
End: 2024-09-27

## 2024-09-27 DIAGNOSIS — L40.50 PSORIATIC ARTHRITIS (H): ICD-10-CM

## 2024-10-12 ENCOUNTER — MYC REFILL (OUTPATIENT)
Dept: FAMILY MEDICINE | Facility: CLINIC | Age: 59
End: 2024-10-12
Payer: COMMERCIAL

## 2024-10-12 DIAGNOSIS — N52.9 ERECTILE DYSFUNCTION, UNSPECIFIED ERECTILE DYSFUNCTION TYPE: ICD-10-CM

## 2024-10-14 RX ORDER — SILDENAFIL 50 MG/1
50 TABLET, FILM COATED ORAL DAILY PRN
Qty: 20 TABLET | Refills: 1 | Status: SHIPPED | OUTPATIENT
Start: 2024-10-14

## 2024-10-16 ENCOUNTER — MYC MEDICAL ADVICE (OUTPATIENT)
Dept: RHEUMATOLOGY | Facility: CLINIC | Age: 59
End: 2024-10-16
Payer: COMMERCIAL

## 2024-10-16 DIAGNOSIS — L40.50 PSORIATIC ARTHRITIS (H): ICD-10-CM

## 2024-10-16 NOTE — TELEPHONE ENCOUNTER
I am sorry. I do not prescribe fluorinated steroids chronically, except under explicit guidance from Dermatology. If patient can get a Derm referral from primary care, and if I can review out of state Derm note regarding the Rx, I will provide refills sufficient to cover patient until the Dermatology appointment.

## 2024-10-16 NOTE — TELEPHONE ENCOUNTER
"See MyChart message from the patient.  He asked his PCP to refill his psoriasis creams and they refused.  Chart review show his last derm appt was 3/28/2022 and [place].  I advised him you are out of the office and could address this when back.     Last seen by you 7/12/24.   \"Plan:  1. Check kidney function, liver function, blood counts, and lipid panel every 4-6 months.  2. upadacitinib 15 mg once daily for psoriatic arthritis.   3. For residual occasional joint pain, use ibuprofen 600 to 800 mg up to twice daily as needed\"    No follow up appt scheduled.    Jessy Jimenez RN    "

## 2024-10-16 NOTE — TELEPHONE ENCOUNTER
Patient called back regarding medication request but priority lines busy. Patient said he will be available for the remainder of the afternoon if possible and tomorrow for a call back. Thanks!

## 2024-10-18 NOTE — TELEPHONE ENCOUNTER
Sent patient MyChart message with Dr. Smith's request.    Charisma Morrissey RN  Adult Rheumatology

## 2024-10-22 RX ORDER — PIMECROLIMUS 10 MG/G
CREAM TOPICAL 2 TIMES DAILY
Qty: 100 G | Refills: 0 | OUTPATIENT
Start: 2024-10-22

## 2024-10-22 RX ORDER — FLUOCINONIDE 0.5 MG/G
CREAM TOPICAL 2 TIMES DAILY
Qty: 30 G | Refills: 0 | OUTPATIENT
Start: 2024-10-22

## 2024-10-22 RX ORDER — CALCIPOTRIENE AND BETAMETHASONE DIPROPIONATE 50; .5 UG/G; MG/G
SUSPENSION TOPICAL
Qty: 120 G | Refills: 0 | OUTPATIENT
Start: 2024-10-22

## 2024-10-22 RX ORDER — HYDROCORTISONE 25 MG/G
CREAM TOPICAL 2 TIMES DAILY
Qty: 30 G | Refills: 0 | OUTPATIENT
Start: 2024-10-22

## 2024-11-25 DIAGNOSIS — N52.9 ERECTILE DYSFUNCTION, UNSPECIFIED ERECTILE DYSFUNCTION TYPE: ICD-10-CM

## 2024-11-25 RX ORDER — SILDENAFIL 50 MG/1
TABLET, FILM COATED ORAL
Qty: 20 TABLET | Refills: 1 | Status: SHIPPED | OUTPATIENT
Start: 2024-11-25

## 2025-01-13 ENCOUNTER — OFFICE VISIT (OUTPATIENT)
Dept: FAMILY MEDICINE | Facility: CLINIC | Age: 60
End: 2025-01-13
Payer: COMMERCIAL

## 2025-01-13 VITALS
TEMPERATURE: 99.2 F | RESPIRATION RATE: 16 BRPM | HEART RATE: 74 BPM | HEIGHT: 68 IN | BODY MASS INDEX: 28.45 KG/M2 | OXYGEN SATURATION: 99 % | SYSTOLIC BLOOD PRESSURE: 140 MMHG | DIASTOLIC BLOOD PRESSURE: 80 MMHG | WEIGHT: 187.7 LBS

## 2025-01-13 DIAGNOSIS — K64.4 EXTERNAL HEMORRHOIDS: Primary | ICD-10-CM

## 2025-01-13 PROCEDURE — 99213 OFFICE O/P EST LOW 20 MIN: CPT | Performed by: NURSE PRACTITIONER

## 2025-01-13 RX ORDER — TERBINAFINE HYDROCHLORIDE 250 MG/1
TABLET ORAL
COMMUNITY
Start: 2024-10-31 | End: 2025-01-13

## 2025-01-13 ASSESSMENT — PATIENT HEALTH QUESTIONNAIRE - PHQ9
SUM OF ALL RESPONSES TO PHQ QUESTIONS 1-9: 0
SUM OF ALL RESPONSES TO PHQ QUESTIONS 1-9: 0
10. IF YOU CHECKED OFF ANY PROBLEMS, HOW DIFFICULT HAVE THESE PROBLEMS MADE IT FOR YOU TO DO YOUR WORK, TAKE CARE OF THINGS AT HOME, OR GET ALONG WITH OTHER PEOPLE: NOT DIFFICULT AT ALL

## 2025-01-13 NOTE — PATIENT INSTRUCTIONS
"May try hydrocortisone 2.5% cream once daily to rectum/hemorrhoid to help shrink hemorrhoid and help with inflammation.  Continue Tucks if helpful.  May try barrier cream such as desitin as often as needed for itching and discomfort.  Sitz bath (warm water soaks) can be helpful as well.  Preventing constipation can help prevent hemorrhoids.          For Constipation  1. Take Miralax 1 scoop once daily in the evening until you have 1 soft stool daily that is easy to pass . If you develop loose stools, you may decrease the dose to a half scoop or take every other day. It may take a few days to start working.   2. Try to increase fiber in your diet by eating more fruits, vegetables or whole grains. Dried fruit, such as prunes are high in fiber. A 1/2 cup of prunes has 6 grams of fiber.   Women need 21-25 grams of fiber daily, men should aim for 30-38 grams daily.   3. You may also increase fiber intake with Metamucil powder or fiber tablets. Be sure to start slow and drink plenty of water.  1 teaspoon of metamucil has 3 grams of insoluble fiber and 2 grams of soluble fiber. Soluble fiber helps improve digestion and possible lower blood sugar. Insoluble fiber can help soften the stool.  4. Try to avoid straining. A squatting toilet stool might be helpful, such as the \"squatty potty\".     "

## 2025-01-13 NOTE — PROGRESS NOTES
"  Assessment & Plan     External hemorrhoids  Recommend he use hydrocortisone 2.5% cream once daily x 7 days to rectum, continue with witch hazel Tucks treatment if helpful.  May apply barrier cream for any discomfort or itching such as Desitin.  Placed referral to colorectal surgery for consideration of hemorrhoid evacuation or banding.  Currently symptoms are tolerable but progressively worsening.  He denies constipation but does have frequent stools.  Most recent colonoscopy did show internal hemorrhoids and diverticulosis.  1 polyp removed.  - Adult Colorectal Surgery  Referral; Future      BMI  Estimated body mass index is 28.96 kg/m  as calculated from the following:    Height as of this encounter: 1.715 m (5' 7.5\").    Weight as of this encounter: 85.1 kg (187 lb 11.2 oz).             Lizzie Davila is a 59 year old, presenting for the following health issues:  Rectal Problem (Hemorrhoids x 2 months, pt has tried tucks which isn't helping and seems like it is getting worse)        1/13/2025    12:47 PM   Additional Questions   Roomed by EVITA Rai     Giovanni is a pleasant 59-year-old gentleman with psoriasis and psoriatic arthritis on immunosuppressive suppressant medication who presents today for evaluation of lump of rectum without bleeding.  States he has had hemorrhoid for 3 to 4 months as he would feel with bowel movements only.  The last few days has noticed persistent firm small bulge of the rectum regardless of bowel movement.  He denies any constipation or diarrhea but does have frequent stools daily about 4-5 daily.  No straining.  Denies any blood in the stool.  Last colonoscopy 1 polyp removed, did show internal hemorrhoids and diverticulosis.  He works as an orthopedic CNA.    History of Present Illness       Reason for visit:  Hemmorhoids  Symptom onset:  1-3 days ago  Symptoms include:  Hemorrhoids maybe prolapsed  Symptom intensity:  Moderate  Symptom progression:  Worsening  Had these " "symptoms before:  Yes  Has tried/received treatment for these symptoms:  No  What makes it worse:  No  What makes it better:  No   He is taking medications regularly.                 Review of Systems  Constitutional, HEENT, cardiovascular, pulmonary, gi and gu systems are negative, except as otherwise noted.      Objective    BP (!) 140/80 (BP Location: Right arm, Patient Position: Sitting, Cuff Size: Adult Regular)   Pulse 74   Temp 99.2  F (37.3  C) (Tympanic)   Resp 16   Ht 1.715 m (5' 7.5\")   Wt 85.1 kg (187 lb 11.2 oz)   SpO2 99%   BMI 28.96 kg/m    Body mass index is 28.96 kg/m .  Physical Exam   GENERAL: alert and no distress  RESP: lungs clear to auscultation - no rales, rhonchi or wheezes  CV: regular rate and rhythm, normal S1 S2, no S3 or S4, no murmur, click or rub, no peripheral edema  ABDOMEN: soft, nontender, no hepatosplenomegaly, no masses and bowel sounds normal  RECTAL: External hemorrhoid, slight bluish hue, dime size  MS: no gross musculoskeletal defects noted, no edema  SKIN: no suspicious lesions or rashes  NEURO: Normal strength and tone, mentation intact and speech normal  PSYCH: mentation appears normal, affect normal/bright            Signed Electronically by: ALFA Logan CNP    "

## 2025-01-24 ENCOUNTER — TRANSFERRED RECORDS (OUTPATIENT)
Dept: HEALTH INFORMATION MANAGEMENT | Facility: CLINIC | Age: 60
End: 2025-01-24
Payer: COMMERCIAL

## 2025-02-08 ENCOUNTER — TELEPHONE (OUTPATIENT)
Dept: RHEUMATOLOGY | Facility: CLINIC | Age: 60
End: 2025-02-08
Payer: COMMERCIAL

## 2025-02-08 NOTE — TELEPHONE ENCOUNTER
Prior Authorization Approval    Medication: RINVOQ 15 MG PO TB24  Authorization Effective Date: 2/8/2025  Authorization Expiration Date: 2/8/2026  Approved Dose/Quantity: 30 tabs per 30 days  Reference #: [id number]   Insurance Company: [employer] - Phone [phone] Fax [phone]  Expected CoPay: $    CoPay Card Available: Yes    Financial Assistance Needed: On file  Which Pharmacy is filling the prescription: [facility] - [place], [place] - [address]  Pharmacy Notified: Not needed  Patient Notified: Not needed

## 2025-02-08 NOTE — TELEPHONE ENCOUNTER
PA Initiation    Medication: RINVOQ 15 MG PO TB24  Insurance Company: [id number] - Phone [phone] Fax [phone]  Pharmacy Filling the Rx: [facility] - [place], [place] - [facility] [address]  Filling Pharmacy Phone:    Filling Pharmacy Fax:    Start Date: 2/8/2025    [id number]

## 2025-03-17 ENCOUNTER — PATIENT OUTREACH (OUTPATIENT)
Dept: CARE COORDINATION | Facility: CLINIC | Age: 60
End: 2025-03-17
Payer: COMMERCIAL

## 2025-03-31 ENCOUNTER — PATIENT OUTREACH (OUTPATIENT)
Dept: CARE COORDINATION | Facility: CLINIC | Age: 60
End: 2025-03-31
Payer: COMMERCIAL

## 2025-04-28 ENCOUNTER — TRANSFERRED RECORDS (OUTPATIENT)
Dept: HEALTH INFORMATION MANAGEMENT | Facility: CLINIC | Age: 60
End: 2025-04-28
Payer: COMMERCIAL

## 2025-04-29 NOTE — PROGRESS NOTES
Assessment/ Plan     57-year-old male with past [place] of psoriatic arthritis, GERD, essential hypertension, major depressive disorder who presents for physical.  Several concerns addressed as well.    1. Screen for colon cancer  2. Colon cancer screening  Appointment already scheduled    3. Screening for prostate cancer  PSA placed by PCP    5. Annual physical exam  - Lipid panel reflex to direct LDL Fasting; Future    6. Psoriatic arthritis (H)  7. Psoriasis  Follows with rheumatology.  Is on a biologic.  Occasionally has skin issues as well that he has various creams for but well controlled with these.    8. Episodic mood disorder (H)  Controlled on Zoloft 100 mg daily    9. Enlarged lymph nodes  Patient has an enlarged lymph node that was originally seen in [place] before he moved here.  Has not changed in the last 5 months.  But has not had a repeat imaging.  I cannot see his previous imaging.  We will repeat a CT scan of his neck.  - CT Soft Tissue Neck w/o & w Contrast; Future    10. Polyuria  Patient had an episode of polyuria 3 months ago that has somewhat persisted since.  His digital rectal exam shows an enlarged prostate but he has few other BPH symptoms such as incomplete emptying or weak stream.  He is only going to the bathroom 1 time a night.  Based off exam I will trial him on Flomax but I recommended a UA as well as PSA already ordered by his PCP.  If these are normal and no improvement with Flomax would recommend referral to urology for further work-up and management.  - tamsulosin (FLOMAX) 0.4 MG capsule; Take 1 capsule (0.4 mg) by mouth daily  Dispense: 30 capsule; Refill: 1  - UA Macro with Reflex to Micro and Culture - lab collect; Future    11. Erectile dysfunction, unspecified erectile dysfunction type  - sildenafil (VIAGRA) 50 MG tablet; Take 1 tablet (50 mg) by mouth daily as needed (erectile dyfunction)  Dispense: 20 tablet; Refill: 0    Follow-up in: depending on labs/imaging    Luke  MD Sree    Subjective:     Giovanni Mcgill is a 57 year old male who presents for an annual exam.     Chief Complaint   Patient presents with     Physical     Prostate exam      Patient tells me that a couple of months ago he had significant urinary urgency. Some dribbling.  Has persisted somewhat. Now he is getting up once per night. No dysuria previously.  He dos have a Hx of nephrolithiasis.       BP Readings from Last 6 Encounters:   02/24/23 (!) 150/90   12/14/22 (!) 163/75   12/08/22 138/76       Colonoscopy: due now.  PSA:  No results found for: PSA     Answers for HPI/ROS submitted by the patient on 2/24/2023  If you checked off any problems, how difficult have these problems made it for you to do your work, take care of things at home, or get along with other people?: Not difficult at all  PHQ9 TOTAL SCORE: 0  Frequency of exercise:: None  Getting at least 3 servings of Calcium per day:: Yes  Diet:: Regular (no restrictions)  Taking medications regularly:: Yes  Medication side effects:: Not applicable  Bi-annual eye exam:: Yes  Dental care twice a year:: NO  Sleep apnea or symptoms of sleep apnea:: None  abdominal pain: No  Blood in stool: No  Blood in urine: No  chest pain: No  chills: No  congestion: No  constipation: No  cough: No  diarrhea: No  dizziness: No  ear pain: No  eye pain: No  nervous/anxious: No  fever: No  frequency: Yes  genital sores: No  headaches: No  hearing loss: Yes  heartburn: No  arthralgias: Yes  joint swelling: Yes  peripheral edema: No  mood changes: No  myalgias: No  nausea: No  dysuria: No  palpitations: No  Skin sensation changes: No  sore throat: No  urgency: Yes  rash: No  shortness of breath: No  visual disturbance: No  weakness: No  impotence: Yes  penile discharge: No  Additional concerns today:: No    Immunization History   Administered Date(s) Administered     COVID-19 Vaccine 12+ (Pfizer) 04/05/2021, 04/26/2021, 12/20/2021     Flu, Unspecified 08/28/2016, 09/20/2017,  10/06/2018, 09/17/2020     HepA-Adult 01/09/2019, 09/10/2019     Influenza Vaccine 50-64 or 18-64 w/egg allergy (Flublok) 09/23/2019, 12/13/2021     Influenza Vaccine, 6+MO IM (QUADRIVALENT W/PRESERVATIVES) 09/11/2014     Mantoux Tuberculin Skin Test 02/10/2016, 12/27/2016     Pneumo Conj 13-V (2010&after) 08/29/2016     Pneumococcal 23 valent 10/24/2016     Tdap (Adacel,Boostrix) 06/07/2011, 12/13/2021     Zoster vaccine recombinant adjuvanted (SHINGRIX) 08/10/2020, 10/13/2020     Immunization status: due today.     Current Outpatient Medications   Medication Sig Dispense Refill     amLODIPine (NORVASC) 5 MG tablet Take 1 tablet (5 mg) by mouth daily 90 tablet 3     aspirin (ASA) 81 MG EC tablet        Calcipotriene-Betameth Diprop 0.005-0.064 % SUSP Apply twice daily to your affected skin for up to 2 weeks. Strength: 0.005-0.064 % 120 g 3     hydrocortisone 2.5 % cream Apply topically 2 times daily 30 g 3     lisinopril-hydrochlorothiazide (ZESTORETIC) 20-25 MG tablet Take 1 tablet by mouth daily 90 tablet 3     Methotrexate 25 MG/ML SOSY Inject 25 mg Subcutaneous       methotrexate 250 MG/10ML SOLN injection        multivitamin w/minerals (THERA-VIT-M) tablet Take 1 tablet by mouth daily       omega 3 1000 MG CAPS Take 2 g by mouth daily       pantoprazole (PROTONIX) 40 MG EC tablet Take 1 tablet (40 mg) by mouth daily 90 tablet 3     pimecrolimus (ELIDEL) 1 % external cream Apply topically 2 times daily 100 g 3     sertraline (ZOLOFT) 100 MG tablet Take 1 tablet (100 mg) by mouth daily 90 tablet 3     traZODone (DESYREL) 100 MG tablet Take 1 tablet (100 mg) by mouth At Bedtime 90 tablet 3     Upadacitinib ER (RINVOQ) 15 MG TB24        Upadacitinib ER 15 MG TB24 Take 15 mg by mouth daily 30 tablet 3     No past medical history on file.  Past Surgical History:   Procedure Laterality Date     REPLACEMENT TOTAL KNEE Bilateral      Patient has no known allergies.  Family History   Problem Relation Age of Onset      "Coronary Artery Disease Mother      Bladder Cancer Father      Social History     Socioeconomic History     Marital status:      Spouse name: Not on file     Number of children: Not on file     Years of education: Not on file     Highest education level: Not on file   Occupational History     Not on file   Tobacco Use     Smoking status: Never     Smokeless tobacco: Never   Substance and Sexual Activity     Alcohol use: Yes     Alcohol/week: 3.0 standard drinks     Types: 1 Glasses of wine, 1 Cans of beer, 1 Shots of liquor per week     Drug use: Never     Sexual activity: Not on file   Other Topics Concern     Not on file   Social History Narrative     Not on file     Social Determinants of Health     Financial Resource Strain: Not on file   Food Insecurity: Not on file   Transportation Needs: Not on file   Physical Activity: Not on file   Stress: Not on file   Social Connections: Not on file   Intimate Partner Violence: Not on file   Housing Stability: Not on file       Review of Systems  Complete ROS negative except as noted in the HPI    Objective:      Vitals:    02/24/23 0705   BP: (!) 150/90   Pulse: 56   Temp: 97.5  F (36.4  C)   SpO2: 98%   Weight: 86.3 kg (190 lb 5 oz)   Height: 1.727 m (5' 8\")       General appearance: Alert, cooperative, no distress, appears stated age  Head: Normocephalic, atraumatic, without obvious abnormality  EARS: TM's gray dull with structures seen bilaterally  Eyes: Pupils equal round, reactive.  Conjunctiva clear.  Nose: Nares normal, no drainage.  Throat: Lips, mucosa, tongue normal mucosa pink and moist  Neck: Supple, symmetric, trachea midline, no adenopathy.  No thyroid enlargement, tenderness or nodules.    Lungs: Clear to auscultation bilaterally, no wheezing or crackles present.  Respirations unlabored  Heart: Regular rate and rhythm, normal S1 and S2, no murmur, rub or gallop.  Abdomen: Soft, nontender, nondistended. No masses or organomegaly.  Extremities: " Extremities normal, atraumatic.  No cyanosis or edema.  Skin: Skin color, texture, turgor normal no rashes or lesions on limited skin exam  Neurologic: CN II through XII intact, normal strength.  Rectal: Prostate enlarged with no irregular nodules felt      Yamil Nix MD     Xray Foot AP + Lateral + Oblique, Left

## 2025-05-03 SDOH — HEALTH STABILITY: PHYSICAL HEALTH: ON AVERAGE, HOW MANY DAYS PER WEEK DO YOU ENGAGE IN MODERATE TO STRENUOUS EXERCISE (LIKE A BRISK WALK)?: 4 DAYS

## 2025-05-03 SDOH — HEALTH STABILITY: PHYSICAL HEALTH: ON AVERAGE, HOW MANY MINUTES DO YOU ENGAGE IN EXERCISE AT THIS LEVEL?: 120 MIN

## 2025-05-03 ASSESSMENT — SOCIAL DETERMINANTS OF HEALTH (SDOH): HOW OFTEN DO YOU GET TOGETHER WITH FRIENDS OR RELATIVES?: MORE THAN THREE TIMES A WEEK

## 2025-05-07 ENCOUNTER — OFFICE VISIT (OUTPATIENT)
Dept: FAMILY MEDICINE | Facility: CLINIC | Age: 60
End: 2025-05-07
Attending: NURSE PRACTITIONER
Payer: COMMERCIAL

## 2025-05-07 VITALS
BODY MASS INDEX: 28.64 KG/M2 | HEART RATE: 50 BPM | RESPIRATION RATE: 16 BRPM | SYSTOLIC BLOOD PRESSURE: 142 MMHG | HEIGHT: 68 IN | WEIGHT: 189 LBS | OXYGEN SATURATION: 97 % | DIASTOLIC BLOOD PRESSURE: 86 MMHG

## 2025-05-07 DIAGNOSIS — E78.2 MIXED HYPERLIPIDEMIA: ICD-10-CM

## 2025-05-07 DIAGNOSIS — I10 ESSENTIAL HYPERTENSION: ICD-10-CM

## 2025-05-07 DIAGNOSIS — Z13.228 SCREENING FOR METABOLIC DISORDER: ICD-10-CM

## 2025-05-07 DIAGNOSIS — F33.0 MILD EPISODE OF RECURRENT MAJOR DEPRESSIVE DISORDER: ICD-10-CM

## 2025-05-07 DIAGNOSIS — Z00.00 ROUTINE GENERAL MEDICAL EXAMINATION AT A HEALTH CARE FACILITY: Primary | ICD-10-CM

## 2025-05-07 DIAGNOSIS — G47.00 INSOMNIA, UNSPECIFIED TYPE: ICD-10-CM

## 2025-05-07 DIAGNOSIS — K21.9 GASTROESOPHAGEAL REFLUX DISEASE WITHOUT ESOPHAGITIS: ICD-10-CM

## 2025-05-07 LAB
ANION GAP SERPL CALCULATED.3IONS-SCNC: 12 MMOL/L (ref 7–15)
BUN SERPL-MCNC: 18.5 MG/DL (ref 6–20)
CALCIUM SERPL-MCNC: 9.5 MG/DL (ref 8.8–10.4)
CHLORIDE SERPL-SCNC: 104 MMOL/L (ref 98–107)
CHOLEST SERPL-MCNC: 173 MG/DL
CREAT SERPL-MCNC: 0.96 MG/DL (ref 0.67–1.17)
EGFRCR SERPLBLD CKD-EPI 2021: >90 ML/MIN/1.73M2
EST. AVERAGE GLUCOSE BLD GHB EST-MCNC: 134 MG/DL
FASTING STATUS PATIENT QL REPORTED: YES
FASTING STATUS PATIENT QL REPORTED: YES
GLUCOSE SERPL-MCNC: 119 MG/DL (ref 70–99)
HBA1C MFR BLD: 6.3 % (ref 0–5.6)
HCO3 SERPL-SCNC: 28 MMOL/L (ref 22–29)
HDLC SERPL-MCNC: 46 MG/DL
LDLC SERPL CALC-MCNC: 106 MG/DL
NONHDLC SERPL-MCNC: 127 MG/DL
POTASSIUM SERPL-SCNC: 4.4 MMOL/L (ref 3.4–5.3)
SODIUM SERPL-SCNC: 144 MMOL/L (ref 135–145)
TRIGL SERPL-MCNC: 104 MG/DL

## 2025-05-07 PROCEDURE — 3077F SYST BP >= 140 MM HG: CPT | Performed by: STUDENT IN AN ORGANIZED HEALTH CARE EDUCATION/TRAINING PROGRAM

## 2025-05-07 PROCEDURE — 36415 COLL VENOUS BLD VENIPUNCTURE: CPT | Performed by: STUDENT IN AN ORGANIZED HEALTH CARE EDUCATION/TRAINING PROGRAM

## 2025-05-07 PROCEDURE — 99214 OFFICE O/P EST MOD 30 MIN: CPT | Mod: 25 | Performed by: STUDENT IN AN ORGANIZED HEALTH CARE EDUCATION/TRAINING PROGRAM

## 2025-05-07 PROCEDURE — 80061 LIPID PANEL: CPT | Performed by: STUDENT IN AN ORGANIZED HEALTH CARE EDUCATION/TRAINING PROGRAM

## 2025-05-07 PROCEDURE — 99396 PREV VISIT EST AGE 40-64: CPT | Performed by: STUDENT IN AN ORGANIZED HEALTH CARE EDUCATION/TRAINING PROGRAM

## 2025-05-07 PROCEDURE — G2211 COMPLEX E/M VISIT ADD ON: HCPCS | Performed by: STUDENT IN AN ORGANIZED HEALTH CARE EDUCATION/TRAINING PROGRAM

## 2025-05-07 PROCEDURE — 80048 BASIC METABOLIC PNL TOTAL CA: CPT | Performed by: STUDENT IN AN ORGANIZED HEALTH CARE EDUCATION/TRAINING PROGRAM

## 2025-05-07 PROCEDURE — 83036 HEMOGLOBIN GLYCOSYLATED A1C: CPT | Performed by: STUDENT IN AN ORGANIZED HEALTH CARE EDUCATION/TRAINING PROGRAM

## 2025-05-07 PROCEDURE — 3079F DIAST BP 80-89 MM HG: CPT | Performed by: STUDENT IN AN ORGANIZED HEALTH CARE EDUCATION/TRAINING PROGRAM

## 2025-05-07 RX ORDER — ATORVASTATIN CALCIUM 20 MG/1
20 TABLET, FILM COATED ORAL DAILY
Qty: 90 TABLET | Refills: 0 | Status: SHIPPED | OUTPATIENT
Start: 2025-05-07

## 2025-05-07 RX ORDER — LISINOPRIL AND HYDROCHLOROTHIAZIDE 20; 25 MG/1; MG/1
1 TABLET ORAL DAILY
Qty: 90 TABLET | Refills: 0 | Status: SHIPPED | OUTPATIENT
Start: 2025-05-07

## 2025-05-07 RX ORDER — AMLODIPINE BESYLATE 5 MG/1
5 TABLET ORAL DAILY
Qty: 90 TABLET | Refills: 3 | OUTPATIENT
Start: 2025-05-07

## 2025-05-07 RX ORDER — AMLODIPINE BESYLATE 10 MG/1
10 TABLET ORAL DAILY
Qty: 90 TABLET | Refills: 3 | Status: SHIPPED | OUTPATIENT
Start: 2025-05-07

## 2025-05-07 RX ORDER — PANTOPRAZOLE SODIUM 40 MG/1
40 TABLET, DELAYED RELEASE ORAL DAILY
Qty: 90 TABLET | Refills: 2 | Status: SHIPPED | OUTPATIENT
Start: 2025-05-07

## 2025-05-07 RX ORDER — SERTRALINE HYDROCHLORIDE 100 MG/1
100 TABLET, FILM COATED ORAL DAILY
Qty: 90 TABLET | Refills: 0 | Status: SHIPPED | OUTPATIENT
Start: 2025-05-07

## 2025-05-07 RX ORDER — TRAZODONE HYDROCHLORIDE 100 MG/1
TABLET ORAL
Qty: 90 TABLET | Refills: 0 | Status: SHIPPED | OUTPATIENT
Start: 2025-05-07

## 2025-05-07 NOTE — PROGRESS NOTES
"Preventive Care Visit  [facility] YANDY Rubin MD, Family Medicine  May 7, 2025      Assessment & Plan     Routine general medical examination at a health care facility:  - Routine examination conducted. Patient is up to date with healthcare maintenance, including colon cancer screening and vaccines.  - Conduct labs for diabetes, kidney function, and cholesterol. No immediate changes to routine care.    Screening for metabolic disorder:  - Previous diabetes screen indicated prediabetes. Screening to be repeated.  - Perform A1c test for diabetes screening. Monitor cholesterol levels.    Essential hypertension:  - Blood pressure management requires adjustment due to marginally elevated readings.  - Increase amlodipine dosage from 5 mg to 10 mg daily. Monitor blood pressure periodically. Allow 2 weeks to 1 month to assess the effect of the increased dose.  - Risks and side effects: Potential leg swelling from increased amlodipine dosage. Monitor for this side effect.    Mild episode of recurrent major depressive disorder:  - Depression questionnaire score is zero, indicating no current depressive symptoms. Zoloft and trazodone are effective and continued as chronic medications.  - Continue current medications without changes.    Consent was obtained from the patient to use an AI documentation tool in the creation of this note.    Patient has been advised of split billing requirements and indicates understanding: Yes    BMI  Estimated body mass index is 29.16 kg/m  as calculated from the following:    Height as of this encounter: 1.715 m (5' 7.5\").    Weight as of this encounter: 85.7 kg (189 lb).   Weight management plan: Discussed healthy diet and exercise guidelines    Counseling  Appropriate preventive services were addressed with this patient via screening, questionnaire, or discussion as appropriate for fall prevention, nutrition, physical activity, Tobacco-use cessation, social " engagement, weight loss and cognition.  Checklist reviewing preventive services available has been given to the patient.  Reviewed patient's diet, addressing concerns and/or questions.       Subjective   Giovanni is a 59 year old, presenting for the following:  Physical (Fasting)        2025     7:10 AM   Additional Questions   Roomed by Lizbeth WHEAT MA          HPI  History of Present Illness-  Giovanni Mcgill, 59 years    Hypertension  - On medications including amlodipine 5 mg and Zestoretic (lisinopril hydrochlorothiazide) 20/25 mg  - Reports fluctuating blood pressure readings, sometimes higher at doctor's visits  - Aware of the need to maintain blood pressure below 80 diastolic for CDL requirements  - Acknowledges the need for weight loss and dietary improvements    Depression  - Diagnosed approximately nine years ago  - Experienced symptoms such as inability to function, lack of sleep, weight loss, and lethargy  - Currently on Zoloft, which has been effective in managing symptoms    Psoriasis and Psoriatic Arthritis  - Diagnosed with psoriasis and psoriatic arthritis  - Underwent bilateral knee replacement about 4 years ago  - Uses calcipotriene betamethasone cream for flare-ups  - Previously on Enbrel, which became ineffective; switched to Rinvoq  - Reports difficulty with hand function, impacting ability to play guitar    Hiatal Hernia  - Diagnosed with a hiatal hernia for the past 25-30 years  - Takes Protonix daily for management    Family History  - Mother with coronary artery disease  - Father with bladder cancer  - Brother  at age 60 from chronic lymphocytic leukemia    Social History  - Works as a CNA at [employer]  - Occasionally consumes alcohol, no history of smoking or drug use  -  for 38 years, sexually active with spouse only    Misc  - Engages in regular exercise, including walking and weightlifting  - Last colonoscopy in , with a history of polyps Giovanni ELIZONDO Nano, 59  years    Hypertension  - On medications including amlodipine 5 mg and Zestoretic (lisinopril hydrochlorothiazide) 20/25 mg  - Reports fluctuating blood pressure readings, sometimes higher at doctor's visits  - Aware of the need to maintain blood pressure below 80 diastolic for CDL requirements  - Acknowledges the need for weight loss and dietary improvements    Depression  - Diagnosed approximately nine years ago  - Experienced symptoms such as inability to function, lack of sleep, weight loss, and lethargy  - Currently on Zoloft, which has been effective in managing symptoms    Psoriasis and Psoriatic Arthritis  - Diagnosed with psoriasis and psoriatic arthritis  - Underwent two surgeries for psoriasis about three years ago  - Uses calcipotriene betamethasone cream for flare-ups  - Previously on Enbrel, which became ineffective; switched to Rinvoq  - Reports difficulty with hand function, impacting ability to play guitar    Hiatal Hernia  - Diagnosed with a hiatal hernia for the past 25-30 years  - Takes Protonix daily for management    Family History  - Mother with coronary artery disease  - Father with bladder cancer  - Brother  at age 60 from chronic lymphocytic leukemia    Social History  - Works as a CNA at [email or web address]  - Occasionally consumes alcohol, no history of smoking or drug use  -  for 38 years, sexually active with spouse only    Misc  - Engages in regular exercise, including walking and weightlifting  - Last colonoscopy in , with a history of polyps    Advance Care Planning    Discussed advance care planning with patient; informed AVS has link to [employer] [employer].        5/3/2025   General Health   How would you rate your overall physical health? Good   Feel stress (tense, anxious, or unable to sleep) Not at all         5/3/2025   Nutrition   Three or more servings of calcium each day? Yes   Diet: Regular (no restrictions)   How many servings of fruit and vegetables per day? 4 or more    How many sweetened beverages each day? (!) 2         5/3/2025   Exercise   Days per week of moderate/strenous exercise 4 days   Average minutes spent exercising at this level 120 min         5/3/2025   Social Factors   Frequency of gathering with friends or relatives More than three times a week   Worry food won't last until get money to buy more No   Food not last or not have enough money for food? No   Do you have housing? (Housing is defined as stable permanent housing and does not include staying outside in a car, in a tent, in an abandoned building, in an overnight shelter, or couch-surfing.) Yes   Are you worried about losing your housing? No   Lack of transportation? No   Unable to get utilities (heat,electricity)? No         5/3/2025   Fall Risk   Fallen 2 or more times in the past year? No   Trouble with walking or balance? No          5/3/2025   Dental   Dentist two times every year? Yes         Today's PHQ-9 Score:       1/13/2025    12:29 PM   PHQ-9 SCORE   PHQ-9 Total Score MyChart 0   PHQ-9 Total Score 0        Patient-reported           5/3/2025   Substance Use   Alcohol more than 3/day or more than 7/wk No   Do you use any other substances recreationally? No     Social History     Tobacco Use    Smoking status: Never     Passive exposure: Never    Smokeless tobacco: Never   Vaping Use    Vaping status: Never Used   Substance Use Topics    Alcohol use: Yes     Alcohol/week: 3.0 standard drinks of alcohol     Types: 1 Glasses of wine, 1 Cans of beer, 1 Shots of liquor per week    Drug use: Never           5/3/2025   STI Screening   New sexual partner(s) since last STI/HIV test? No   Last PSA:   PSA Tumor Marker   Date Value Ref Range Status   04/15/2024 2.73 0.00 - 3.50 ng/mL Final     ASCVD Risk   The ASCVD Risk score (Yo ALEGRIA, et al., 2019) failed to calculate for the following reasons:    The valid total cholesterol range is 130 to 320 mg/dL    Fracture Risk Assessment Tool  Link to  "Frax Calculator  Use the information below to complete the Frax calculator  : 1965  Sex: male  Weight (kg): 85.7 kg (actual weight)  Height (cm): 171.5 cm  Previous Fragility Fracture:  No  History of parent with fractured hip:  No  Current Smoking:  No  Patient has been on glucocorticoids for more than 3 months (5mg/day or more): No  Rheumatoid Arthritis on Problem List:  No  Secondary Osteoporosis on Problem List:  No  Consumes 3 or more units of alcohol per day: No  Femoral Neck BMD (g/cm2)           Reviewed and updated as needed this visit by Provider                    Objective    Exam  BP (!) 142/86 (BP Location: Left arm, Patient Position: Sitting, Cuff Size: Adult Regular)   Pulse 50   Resp 16   Ht 1.715 m (5' 7.5\")   Wt 85.7 kg (189 lb)   SpO2 97%   BMI 29.16 kg/m     Estimated body mass index is 29.16 kg/m  as calculated from the following:    Height as of this encounter: 1.715 m (5' 7.5\").    Weight as of this encounter: 85.7 kg (189 lb).    Physical Exam  GENERAL: alert and no distress  EYES: Eyes grossly normal to inspection, PERRL and conjunctivae and sclerae normal  HENT: ear canals and TM's normal, nose and mouth without ulcers or lesions  NECK: no adenopathy, no asymmetry, masses, or scars  RESP: lungs clear to auscultation - no rales, rhonchi or wheezes  CV: regular rate and rhythm, normal S1 S2, no S3 or S4, no murmur, click or rub, no peripheral edema  MS: no gross musculoskeletal defects noted, no edema  SKIN: no suspicious lesions or rashes  NEURO: Normal strength and tone, mentation intact and speech normal  PSYCH: mentation appears normal, affect normal/bright    Signed Electronically by: Tonia Rubin MD    "

## 2025-05-07 NOTE — PATIENT INSTRUCTIONS
Patient Education   Preventive Care Advice   This is general advice given by our system to help you stay healthy. However, your care team may have specific advice just for you. Please talk to your care team about your preventive care needs.  Nutrition  Eat 5 or more servings of fruits and vegetables each day.  Try wheat bread, brown rice and whole grain pasta (instead of white bread, rice, and pasta).  Get enough calcium and vitamin D. Check the label on foods and aim for 100% of the RDA (recommended daily allowance).  Lifestyle  Exercise at least 150 minutes each week  (30 minutes a day, 5 days a week).  Do muscle strengthening activities 2 days a week. These help control your weight and prevent disease.  No smoking.  Wear sunscreen to prevent skin cancer.  Have a dental exam and cleaning every 6 months.  Yearly exams  See your health care team every year to talk about:  Any changes in your health.  Any medicines your care team has prescribed.  Preventive care, family planning, and ways to prevent chronic diseases.  Shots (vaccines)   HPV shots (up to age 26), if you've never had them before.  Hepatitis B shots (up to age 59), if you've never had them before.  COVID-19 shot: Get this shot when it's due.  Flu shot: Get a flu shot every year.  Tetanus shot: Get a tetanus shot every 10 years.  Pneumococcal, hepatitis A, and RSV shots: Ask your care team if you need these based on your risk.  Shingles shot (for age 50 and up)  General health tests  Diabetes screening:  Starting at age 35, Get screened for diabetes at least every 3 years.  If you are younger than age 35, ask your care team if you should be screened for diabetes.  Cholesterol test: At age 39, start having a cholesterol test every 5 years, or more often if advised.  Bone density scan (DEXA): At age 50, ask your care team if you should have this scan for osteoporosis (brittle bones).  Hepatitis C: Get tested at least once in your life.  STIs (sexually  transmitted infections)  Before age 24: Ask your care team if you should be screened for STIs.  After age 24: Get screened for STIs if you're at risk. You are at risk for STIs (including HIV) if:  You are sexually active with more than one person.  You don't use condoms every time.  You or a partner was diagnosed with a sexually transmitted infection.  If you are at risk for HIV, ask about PrEP medicine to prevent HIV.  Get tested for HIV at least once in your life, whether you are at risk for HIV or not.  Cancer screening tests  Cervical cancer screening: If you have a cervix, begin getting regular cervical cancer screening tests starting at age 21.  Breast cancer scan (mammogram): If you've ever had breasts, begin having regular mammograms starting at age 40. This is a scan to check for breast cancer.  Colon cancer screening: It is important to start screening for colon cancer at age 45.  Have a colonoscopy test every 10 years (or more often if you're at risk) Or, ask your provider about stool tests like a FIT test every year or Cologuard test every 3 years.  To learn more about your testing options, visit:   .  For help making a decision, visit:   https://bit.ly/ev68548.  Prostate cancer screening test: If you have a prostate, ask your care team if a prostate cancer screening test (PSA) at age 55 is right for you.  Lung cancer screening: If you are a current or former smoker ages 50 to 80, ask your care team if ongoing lung cancer screenings are right for you.  For informational purposes only. Not to replace the advice of your health care provider. Copyright   2023 [employer]. All rights reserved. Clinically reviewed by the [facility]. [employer] 334488 - REV 01/24.

## 2025-05-08 ENCOUNTER — RESULTS FOLLOW-UP (OUTPATIENT)
Dept: FAMILY MEDICINE | Facility: CLINIC | Age: 60
End: 2025-05-08

## 2025-05-08 NOTE — TELEPHONE ENCOUNTER
Marquis message sent to pt refills sent and pt is due for a physical/med check for further refills    Carolin Bai MA

## 2025-06-05 RX ORDER — UPADACITINIB 15 MG/1
15 TABLET, EXTENDED RELEASE ORAL DAILY
Qty: 90 TABLET | Refills: 2 | OUTPATIENT
Start: 2025-06-05

## 2025-06-30 ENCOUNTER — MYC MEDICAL ADVICE (OUTPATIENT)
Dept: FAMILY MEDICINE | Facility: CLINIC | Age: 60
End: 2025-06-30
Payer: COMMERCIAL

## 2025-06-30 NOTE — TELEPHONE ENCOUNTER
Patient Quality Outreach    Patient is due for the following:   Hypertension -  BP check    Action(s) Taken:   Schedule BP check with MA or RN.     Type of outreach:    Sent APR message.    Questions for provider review:    None         Mayank Hernandes RN

## 2025-08-04 DIAGNOSIS — G47.00 INSOMNIA, UNSPECIFIED TYPE: ICD-10-CM

## 2025-08-04 DIAGNOSIS — F33.0 MILD EPISODE OF RECURRENT MAJOR DEPRESSIVE DISORDER: ICD-10-CM

## 2025-08-04 DIAGNOSIS — E78.2 MIXED HYPERLIPIDEMIA: ICD-10-CM

## 2025-08-04 DIAGNOSIS — I10 ESSENTIAL HYPERTENSION: ICD-10-CM

## 2025-08-06 RX ORDER — ATORVASTATIN CALCIUM 20 MG/1
20 TABLET, FILM COATED ORAL DAILY
Qty: 90 TABLET | Refills: 2 | Status: SHIPPED | OUTPATIENT
Start: 2025-08-06

## 2025-08-06 RX ORDER — SERTRALINE HYDROCHLORIDE 100 MG/1
100 TABLET, FILM COATED ORAL DAILY
Qty: 90 TABLET | Refills: 2 | Status: SHIPPED | OUTPATIENT
Start: 2025-08-06

## 2025-08-06 RX ORDER — LISINOPRIL AND HYDROCHLOROTHIAZIDE 20; 25 MG/1; MG/1
1 TABLET ORAL DAILY
Qty: 90 TABLET | Refills: 2 | Status: SHIPPED | OUTPATIENT
Start: 2025-08-06

## 2025-08-06 RX ORDER — TRAZODONE HYDROCHLORIDE 100 MG/1
TABLET ORAL
Qty: 90 TABLET | Refills: 2 | Status: SHIPPED | OUTPATIENT
Start: 2025-08-06